# Patient Record
Sex: FEMALE | Race: WHITE | Employment: FULL TIME | URBAN - METROPOLITAN AREA
[De-identification: names, ages, dates, MRNs, and addresses within clinical notes are randomized per-mention and may not be internally consistent; named-entity substitution may affect disease eponyms.]

---

## 2019-11-13 ENCOUNTER — HOSPITAL ENCOUNTER (EMERGENCY)
Age: 39
Discharge: HOME | End: 2019-11-13
Payer: COMMERCIAL

## 2019-11-13 ENCOUNTER — APPOINTMENT (OUTPATIENT)
Dept: GENERAL RADIOLOGY | Age: 39
End: 2019-11-13
Payer: COMMERCIAL

## 2019-11-13 VITALS
TEMPERATURE: 98.1 F | BODY MASS INDEX: 39.27 KG/M2 | RESPIRATION RATE: 18 BRPM | OXYGEN SATURATION: 100 % | HEART RATE: 95 BPM | SYSTOLIC BLOOD PRESSURE: 133 MMHG | DIASTOLIC BLOOD PRESSURE: 67 MMHG | WEIGHT: 230 LBS | HEIGHT: 64 IN

## 2019-11-13 DIAGNOSIS — S90.851A FOREIGN BODY IN RIGHT FOOT, INITIAL ENCOUNTER: Primary | ICD-10-CM

## 2019-11-13 DIAGNOSIS — Z23 NEED FOR TDAP VACCINATION: ICD-10-CM

## 2019-11-13 PROCEDURE — 90715 TDAP VACCINE 7 YRS/> IM: CPT | Performed by: NURSE PRACTITIONER

## 2019-11-13 PROCEDURE — 6360000002 HC RX W HCPCS: Performed by: NURSE PRACTITIONER

## 2019-11-13 PROCEDURE — 90471 IMMUNIZATION ADMIN: CPT | Performed by: NURSE PRACTITIONER

## 2019-11-13 PROCEDURE — 73630 X-RAY EXAM OF FOOT: CPT

## 2019-11-13 PROCEDURE — 2500000003 HC RX 250 WO HCPCS: Performed by: NURSE PRACTITIONER

## 2019-11-13 PROCEDURE — 99283 EMERGENCY DEPT VISIT LOW MDM: CPT

## 2019-11-13 PROCEDURE — 10120 INC&RMVL FB SUBQ TISS SMPL: CPT

## 2019-11-13 RX ORDER — LIDOCAINE HYDROCHLORIDE 10 MG/ML
5 INJECTION, SOLUTION EPIDURAL; INFILTRATION; INTRACAUDAL; PERINEURAL ONCE
Status: COMPLETED | OUTPATIENT
Start: 2019-11-13 | End: 2019-11-13

## 2019-11-13 RX ORDER — ESTRADIOL AND NORETHINDRONE ACETATE .5; .1 MG/1; MG/1
TABLET ORAL DAILY
COMMUNITY

## 2019-11-13 RX ORDER — DOXYCYCLINE HYCLATE 50 MG/1
324 CAPSULE, GELATIN COATED ORAL
COMMUNITY

## 2019-11-13 RX ORDER — CEPHALEXIN 500 MG/1
1000 CAPSULE ORAL 2 TIMES DAILY
Qty: 40 CAPSULE | Refills: 0 | Status: SHIPPED | OUTPATIENT
Start: 2019-11-13

## 2019-11-13 RX ORDER — OMEPRAZOLE 40 MG/1
40 CAPSULE, DELAYED RELEASE ORAL DAILY
COMMUNITY

## 2019-11-13 RX ORDER — ERGOCALCIFEROL (VITAMIN D2) 1250 MCG
50000 CAPSULE ORAL WEEKLY
COMMUNITY

## 2019-11-13 RX ADMIN — LIDOCAINE HYDROCHLORIDE 5 ML: 10 INJECTION, SOLUTION EPIDURAL; INFILTRATION; INTRACAUDAL; PERINEURAL at 12:27

## 2019-11-13 RX ADMIN — TETANUS TOXOID, REDUCED DIPHTHERIA TOXOID AND ACELLULAR PERTUSSIS VACCINE, ADSORBED 0.5 ML: 5; 2.5; 8; 8; 2.5 SUSPENSION INTRAMUSCULAR at 12:24

## 2019-11-13 SDOH — HEALTH STABILITY: MENTAL HEALTH: HOW OFTEN DO YOU HAVE A DRINK CONTAINING ALCOHOL?: NEVER

## 2019-11-13 ASSESSMENT — PAIN SCALES - GENERAL: PAINLEVEL_OUTOF10: 7

## 2019-11-13 ASSESSMENT — PAIN DESCRIPTION - ONSET: ONSET: SUDDEN

## 2019-11-13 ASSESSMENT — PAIN DESCRIPTION - ORIENTATION: ORIENTATION: RIGHT

## 2019-11-13 ASSESSMENT — PAIN DESCRIPTION - FREQUENCY: FREQUENCY: CONTINUOUS

## 2019-11-13 ASSESSMENT — PAIN DESCRIPTION - LOCATION: LOCATION: FOOT

## 2019-11-13 ASSESSMENT — ENCOUNTER SYMPTOMS
ABDOMINAL PAIN: 0
SHORTNESS OF BREATH: 0
COUGH: 0
BACK PAIN: 0

## 2019-11-13 ASSESSMENT — PAIN DESCRIPTION - DESCRIPTORS: DESCRIPTORS: THROBBING

## 2019-11-13 ASSESSMENT — PAIN DESCRIPTION - PAIN TYPE: TYPE: ACUTE PAIN

## 2023-03-01 LAB
ALANINE AMINOTRANSFERASE (SGPT) (U/L) IN SER/PLAS: 24 U/L (ref 7–45)
ALBUMIN (G/DL) IN SER/PLAS: 4.4 G/DL (ref 3.4–5)
ALKALINE PHOSPHATASE (U/L) IN SER/PLAS: 91 U/L (ref 33–110)
ANION GAP IN SER/PLAS: 11 MMOL/L (ref 10–20)
ASPARTATE AMINOTRANSFERASE (SGOT) (U/L) IN SER/PLAS: 20 U/L (ref 9–39)
BILIRUBIN TOTAL (MG/DL) IN SER/PLAS: 0.4 MG/DL (ref 0–1.2)
CALCIUM (MG/DL) IN SER/PLAS: 9.4 MG/DL (ref 8.6–10.3)
CARBON DIOXIDE, TOTAL (MMOL/L) IN SER/PLAS: 25 MMOL/L (ref 21–32)
CHLORIDE (MMOL/L) IN SER/PLAS: 106 MMOL/L (ref 98–107)
CREATININE (MG/DL) IN SER/PLAS: 0.98 MG/DL (ref 0.5–1.05)
ERYTHROCYTE DISTRIBUTION WIDTH (RATIO) BY AUTOMATED COUNT: 14.2 % (ref 11.5–14.5)
ERYTHROCYTE MEAN CORPUSCULAR HEMOGLOBIN CONCENTRATION (G/DL) BY AUTOMATED: 32.3 G/DL (ref 32–36)
ERYTHROCYTE MEAN CORPUSCULAR VOLUME (FL) BY AUTOMATED COUNT: 92 FL (ref 80–100)
ERYTHROCYTES (10*6/UL) IN BLOOD BY AUTOMATED COUNT: 4.68 X10E12/L (ref 4–5.2)
GFR FEMALE: 74 ML/MIN/1.73M2
GLUCOSE (MG/DL) IN SER/PLAS: 122 MG/DL (ref 74–99)
HEMATOCRIT (%) IN BLOOD BY AUTOMATED COUNT: 43 % (ref 36–46)
HEMOGLOBIN (G/DL) IN BLOOD: 13.9 G/DL (ref 12–16)
LEUKOCYTES (10*3/UL) IN BLOOD BY AUTOMATED COUNT: 7.8 X10E9/L (ref 4.4–11.3)
PLATELETS (10*3/UL) IN BLOOD AUTOMATED COUNT: 313 X10E9/L (ref 150–450)
POTASSIUM (MMOL/L) IN SER/PLAS: 4 MMOL/L (ref 3.5–5.3)
PROTEIN TOTAL: 7.6 G/DL (ref 6.4–8.2)
SODIUM (MMOL/L) IN SER/PLAS: 138 MMOL/L (ref 136–145)
UREA NITROGEN (MG/DL) IN SER/PLAS: 17 MG/DL (ref 6–23)

## 2023-03-06 PROBLEM — J40 SINOBRONCHITIS: Status: ACTIVE | Noted: 2023-03-06

## 2023-03-06 PROBLEM — J30.9 ALLERGIC RHINITIS: Status: ACTIVE | Noted: 2023-03-06

## 2023-03-06 PROBLEM — J18.9 PNEUMONIA INVOLVING RIGHT LUNG: Status: ACTIVE | Noted: 2023-03-06

## 2023-03-06 PROBLEM — N90.89 LABIAL ADHESIONS: Status: ACTIVE | Noted: 2023-03-06

## 2023-03-06 PROBLEM — K57.32 DIVERTICULITIS, COLON: Status: ACTIVE | Noted: 2023-03-06

## 2023-03-06 PROBLEM — G51.8 FACIAL NEURITIS: Status: ACTIVE | Noted: 2023-03-06

## 2023-03-06 PROBLEM — E66.01 MORBID OBESITY WITH BODY MASS INDEX (BMI) OF 40.0 OR HIGHER (MULTI): Status: ACTIVE | Noted: 2023-03-06

## 2023-03-06 PROBLEM — D50.9 IRON DEFICIENCY ANEMIA: Status: ACTIVE | Noted: 2023-03-06

## 2023-03-06 PROBLEM — E89.41 SURGICAL MENOPAUSE, SYMPTOMATIC: Status: ACTIVE | Noted: 2023-03-06

## 2023-03-06 PROBLEM — C21.0 ANAL SQUAMOUS CELL CARCINOMA (MULTI): Status: ACTIVE | Noted: 2023-03-06

## 2023-03-06 PROBLEM — E55.9 VITAMIN D DEFICIENCY: Status: ACTIVE | Noted: 2023-03-06

## 2023-03-06 PROBLEM — F32.9 MDD (MAJOR DEPRESSIVE DISORDER): Status: ACTIVE | Noted: 2023-03-06

## 2023-03-06 PROBLEM — G43.119 MIGRAINE WITH AURA, INTRACTABLE: Status: ACTIVE | Noted: 2023-03-06

## 2023-03-06 PROBLEM — L65.9 LOSS OF HAIR: Status: ACTIVE | Noted: 2023-03-06

## 2023-03-06 PROBLEM — M79.10 MYALGIA: Status: ACTIVE | Noted: 2023-03-06

## 2023-03-06 PROBLEM — R10.2 VAGINAL PAIN: Status: ACTIVE | Noted: 2023-03-06

## 2023-03-06 PROBLEM — J34.89 SINUS PAIN: Status: ACTIVE | Noted: 2023-03-06

## 2023-03-06 PROBLEM — G25.81 RESTLESS LEGS SYNDROME: Status: ACTIVE | Noted: 2023-03-06

## 2023-03-06 PROBLEM — R50.9 FEVER: Status: ACTIVE | Noted: 2023-03-06

## 2023-03-06 PROBLEM — R23.2 VASOMOTOR FLUSHING: Status: ACTIVE | Noted: 2023-03-06

## 2023-03-06 PROBLEM — N76.0 RADIATION VAGINITIS: Status: ACTIVE | Noted: 2023-03-06

## 2023-03-06 PROBLEM — J32.9 SINOBRONCHITIS: Status: ACTIVE | Noted: 2023-03-06

## 2023-03-06 PROBLEM — G43.909 MIGRAINE SYNDROME: Status: ACTIVE | Noted: 2023-03-06

## 2023-03-06 PROBLEM — R05.9 COUGH: Status: ACTIVE | Noted: 2023-03-06

## 2023-03-06 PROBLEM — N89.5 VAGINAL ADHESIONS: Status: ACTIVE | Noted: 2023-03-06

## 2023-03-06 PROBLEM — L60.0 INGROWN TOENAIL OF BOTH FEET: Status: ACTIVE | Noted: 2023-03-06

## 2023-03-06 PROBLEM — M25.532 LEFT WRIST PAIN: Status: ACTIVE | Noted: 2023-03-06

## 2023-03-06 PROBLEM — R19.7 ACUTE DIARRHEA: Status: ACTIVE | Noted: 2023-03-06

## 2023-03-06 PROBLEM — L65.8 FEMALE PATTERN HAIR LOSS: Status: ACTIVE | Noted: 2023-03-06

## 2023-03-06 PROBLEM — K21.9 CHRONIC GERD: Status: ACTIVE | Noted: 2023-03-06

## 2023-03-06 RX ORDER — RIZATRIPTAN BENZOATE 10 MG/1
TABLET, ORALLY DISINTEGRATING ORAL
COMMUNITY

## 2023-03-06 RX ORDER — ESTRADIOL 0.05 MG/D
PATCH TRANSDERMAL
COMMUNITY
Start: 2022-07-28

## 2023-03-06 RX ORDER — PROGESTERONE 100 MG/1
1 CAPSULE ORAL DAILY
COMMUNITY
Start: 2022-06-29

## 2023-03-06 RX ORDER — LORATADINE 10 MG/1
1 TABLET ORAL NIGHTLY
COMMUNITY
Start: 2019-09-18 | End: 2023-03-07 | Stop reason: SDUPTHER

## 2023-03-06 RX ORDER — ERGOCALCIFEROL 1.25 MG/1
1 CAPSULE ORAL
COMMUNITY
Start: 2020-05-13 | End: 2023-04-10 | Stop reason: SDUPTHER

## 2023-03-06 RX ORDER — ALBUTEROL SULFATE 90 UG/1
2 AEROSOL, METERED RESPIRATORY (INHALATION) EVERY 4 HOURS PRN
COMMUNITY

## 2023-03-06 RX ORDER — TOPIRAMATE 50 MG/1
1 TABLET, FILM COATED ORAL 2 TIMES DAILY
COMMUNITY
End: 2023-03-07 | Stop reason: SDUPTHER

## 2023-03-06 RX ORDER — CHONDROIT/COLLAGEN/HYALURON/AA 80-400-40
1 CAPSULE ORAL DAILY
COMMUNITY
Start: 2022-06-29

## 2023-03-06 RX ORDER — ROPINIROLE 4 MG/1
1 TABLET, FILM COATED ORAL NIGHTLY
COMMUNITY
End: 2023-03-07 | Stop reason: SDUPTHER

## 2023-03-06 RX ORDER — ESTRADIOL 0.07 MG/D
PATCH TRANSDERMAL
COMMUNITY
Start: 2022-07-28

## 2023-03-06 RX ORDER — DULOXETIN HYDROCHLORIDE 60 MG/1
1 CAPSULE, DELAYED RELEASE ORAL 2 TIMES DAILY
COMMUNITY
End: 2023-03-07 | Stop reason: SDUPTHER

## 2023-03-06 RX ORDER — OMEPRAZOLE 40 MG/1
1 CAPSULE, DELAYED RELEASE ORAL DAILY
COMMUNITY
Start: 2018-08-01 | End: 2023-03-07 | Stop reason: SDUPTHER

## 2023-03-07 DIAGNOSIS — G25.81 RESTLESS LEGS SYNDROME: ICD-10-CM

## 2023-03-07 DIAGNOSIS — G43.909 MIGRAINE SYNDROME: ICD-10-CM

## 2023-03-07 DIAGNOSIS — J30.89 SEASONAL ALLERGIC RHINITIS DUE TO OTHER ALLERGIC TRIGGER: ICD-10-CM

## 2023-03-07 DIAGNOSIS — K21.9 CHRONIC GERD: ICD-10-CM

## 2023-03-07 DIAGNOSIS — F33.9 EPISODE OF RECURRENT MAJOR DEPRESSIVE DISORDER, UNSPECIFIED DEPRESSION EPISODE SEVERITY (CMS-HCC): ICD-10-CM

## 2023-03-07 RX ORDER — ROPINIROLE 4 MG/1
4 TABLET, FILM COATED ORAL NIGHTLY
Qty: 30 TABLET | Refills: 0 | Status: SHIPPED | OUTPATIENT
Start: 2023-03-07 | End: 2023-04-10 | Stop reason: SDUPTHER

## 2023-03-07 RX ORDER — LORATADINE 10 MG/1
10 TABLET ORAL NIGHTLY
Qty: 30 TABLET | Refills: 0 | Status: SHIPPED | OUTPATIENT
Start: 2023-03-07 | End: 2023-04-10 | Stop reason: SDUPTHER

## 2023-03-07 RX ORDER — OMEPRAZOLE 40 MG/1
40 CAPSULE, DELAYED RELEASE ORAL DAILY
Qty: 30 CAPSULE | Refills: 0 | Status: SHIPPED | OUTPATIENT
Start: 2023-03-07 | End: 2023-04-10 | Stop reason: SDUPTHER

## 2023-03-07 RX ORDER — TOPIRAMATE 50 MG/1
50 TABLET, FILM COATED ORAL 2 TIMES DAILY
Qty: 60 TABLET | Refills: 0 | Status: SHIPPED | OUTPATIENT
Start: 2023-03-07 | End: 2023-04-10 | Stop reason: SDUPTHER

## 2023-03-07 RX ORDER — DULOXETIN HYDROCHLORIDE 60 MG/1
60 CAPSULE, DELAYED RELEASE ORAL 2 TIMES DAILY
Qty: 60 CAPSULE | Refills: 0 | Status: SHIPPED | OUTPATIENT
Start: 2023-03-07 | End: 2023-04-10 | Stop reason: SDUPTHER

## 2023-04-06 ENCOUNTER — APPOINTMENT (OUTPATIENT)
Dept: PRIMARY CARE | Facility: CLINIC | Age: 43
End: 2023-04-06
Payer: COMMERCIAL

## 2023-04-10 ENCOUNTER — OFFICE VISIT (OUTPATIENT)
Dept: PRIMARY CARE | Facility: CLINIC | Age: 43
End: 2023-04-10
Payer: COMMERCIAL

## 2023-04-10 VITALS
WEIGHT: 274 LBS | TEMPERATURE: 98 F | DIASTOLIC BLOOD PRESSURE: 80 MMHG | BODY MASS INDEX: 45.65 KG/M2 | SYSTOLIC BLOOD PRESSURE: 124 MMHG | RESPIRATION RATE: 18 BRPM | HEART RATE: 80 BPM | HEIGHT: 65 IN

## 2023-04-10 DIAGNOSIS — F33.9 EPISODE OF RECURRENT MAJOR DEPRESSIVE DISORDER, UNSPECIFIED DEPRESSION EPISODE SEVERITY (CMS-HCC): ICD-10-CM

## 2023-04-10 DIAGNOSIS — G25.81 RESTLESS LEGS SYNDROME: ICD-10-CM

## 2023-04-10 DIAGNOSIS — G43.909 MIGRAINE SYNDROME: ICD-10-CM

## 2023-04-10 DIAGNOSIS — K21.9 CHRONIC GERD: ICD-10-CM

## 2023-04-10 DIAGNOSIS — J30.89 SEASONAL ALLERGIC RHINITIS DUE TO OTHER ALLERGIC TRIGGER: ICD-10-CM

## 2023-04-10 DIAGNOSIS — E55.9 VITAMIN D DEFICIENCY: ICD-10-CM

## 2023-04-10 PROBLEM — J32.9 SINOBRONCHITIS: Status: RESOLVED | Noted: 2023-03-06 | Resolved: 2023-04-10

## 2023-04-10 PROBLEM — M25.532 LEFT WRIST PAIN: Status: RESOLVED | Noted: 2023-03-06 | Resolved: 2023-04-10

## 2023-04-10 PROBLEM — R05.9 COUGH: Status: RESOLVED | Noted: 2023-03-06 | Resolved: 2023-04-10

## 2023-04-10 PROBLEM — N89.5 VAGINAL ADHESIONS: Status: RESOLVED | Noted: 2023-03-06 | Resolved: 2023-04-10

## 2023-04-10 PROBLEM — J34.89 SINUS PAIN: Status: RESOLVED | Noted: 2023-03-06 | Resolved: 2023-04-10

## 2023-04-10 PROBLEM — J40 SINOBRONCHITIS: Status: RESOLVED | Noted: 2023-03-06 | Resolved: 2023-04-10

## 2023-04-10 PROBLEM — R50.9 FEVER: Status: RESOLVED | Noted: 2023-03-06 | Resolved: 2023-04-10

## 2023-04-10 PROCEDURE — 99214 OFFICE O/P EST MOD 30 MIN: CPT | Performed by: FAMILY MEDICINE

## 2023-04-10 PROCEDURE — 1036F TOBACCO NON-USER: CPT | Performed by: FAMILY MEDICINE

## 2023-04-10 RX ORDER — PROGESTERONE 100 MG/1
100 CAPSULE ORAL DAILY
Qty: 90 CAPSULE | Refills: 1 | Status: CANCELLED | OUTPATIENT
Start: 2023-04-10

## 2023-04-10 RX ORDER — ERGOCALCIFEROL 1.25 MG/1
50000 CAPSULE ORAL
Qty: 15 CAPSULE | Refills: 1 | Status: SHIPPED | OUTPATIENT
Start: 2023-04-10

## 2023-04-10 RX ORDER — DULOXETIN HYDROCHLORIDE 60 MG/1
60 CAPSULE, DELAYED RELEASE ORAL 2 TIMES DAILY
Qty: 180 CAPSULE | Refills: 1 | Status: SHIPPED | OUTPATIENT
Start: 2023-04-10

## 2023-04-10 RX ORDER — OMEPRAZOLE 40 MG/1
40 CAPSULE, DELAYED RELEASE ORAL DAILY
Qty: 90 CAPSULE | Refills: 1 | Status: SHIPPED | OUTPATIENT
Start: 2023-04-10

## 2023-04-10 RX ORDER — LORATADINE 10 MG/1
10 TABLET ORAL NIGHTLY
Qty: 90 TABLET | Refills: 1 | Status: SHIPPED | OUTPATIENT
Start: 2023-04-10

## 2023-04-10 RX ORDER — ROPINIROLE 4 MG/1
4 TABLET, FILM COATED ORAL NIGHTLY
Qty: 90 TABLET | Refills: 1 | Status: SHIPPED | OUTPATIENT
Start: 2023-04-10

## 2023-04-10 RX ORDER — TOPIRAMATE 50 MG/1
50 TABLET, FILM COATED ORAL 2 TIMES DAILY
Qty: 90 TABLET | Refills: 1 | Status: SHIPPED | OUTPATIENT
Start: 2023-04-10

## 2023-04-10 NOTE — PROGRESS NOTES
Елена Vaughn is a 42 y.o. female here today for 6 month follow up depression, vitamin d deficiency, Gerd, RLS, and migraines.      GERD recheck -- Patient reports GI symptoms are well controlled with current treatment.  No heartburn, chest pain, vomiting, diarrhea.  No SE's from current treatment.  Patient wishes to continue the same treatment.  Has to take meds daily.      Mood disorder recheck -- Patient feels like condition is well controlled.  Has good control of mood and emotional reactions.  No SE's or problems with medications.  No homicidal or suicidal ideation.  Patient wishes to continue same medications.      RLS - taking medication nightly.  Seems to work well with no SE's.      Vitamin D deficiency -- Patient reports no muscle weakness or pain.  Taking Vitamin D as instructed. Takes 50,000 U weekly.      Migraine HA's - stable with rare HA's.  No problems with medications.  No new headaches and no focal neurological deficits.  Very rare HA's now.  Only one every 2-3 months.      Anal cancer - seeing oncologist and other specialists for fu after treatment.     Objective    Visit Vitals  /80   Pulse 80   Temp 36.7 °C (98 °F)   Resp 18     Body mass index is 45.6 kg/m².     Physical Exam   General - Not in acute distress and cooperative.  Build & Nutrition - Well developed  Posture - Normal  Gait - Normal  Mental Status - alert and oriented x 3    Head - Normocephalic    Neck - Thyroid normal size    Eyes - Bilateral - Sclera clear and lids pink without edema or mass.      Skin - Warm and dry with no rashes on visible skin    Lungs - Clear to auscultation and normal breathing effort    Cardiovascular - RRR and no murmurs, rubs or thrill.    Peripheral Vascular - Bilateral - no edema present    Neuropsychiatric - normal mood and affect        Assessment    1. Episode of recurrent major depressive disorder, unspecified depression episode severity (CMS/HCC)     Condition well controlled.  No change in  current treatment regimen.  Refill given of current medication.  Appropriate labs ordered or reviewed.  Make a follow up appointment with me for recheck in 6 months.   2. Vitamin D deficiency     Condition well controlled.  No change in current treatment regimen.  Refill given of current medication.  Appropriate labs ordered or reviewed.  Make a follow up appointment with me for recheck in 6 months.   3. Chronic GERD     Condition well controlled.  No change in current treatment regimen.  Refill given of current medication.  Make a follow up appointment with me for recheck in 6 months.   4. Restless legs syndrome     Condition well controlled.  No change in current treatment regimen.  Refill given of current medication.  Make a follow up appointment with me for recheck in 6 months.   5. Migraine syndrome     Condition well controlled.  No change in current treatment regimen.  Refill given of current medication.  Make a follow up appointment with me for recheck in 6 months.

## 2023-10-10 ENCOUNTER — APPOINTMENT (OUTPATIENT)
Dept: PRIMARY CARE | Facility: CLINIC | Age: 43
End: 2023-10-10
Payer: COMMERCIAL

## 2024-02-05 ENCOUNTER — HOSPITAL ENCOUNTER (EMERGENCY)
Facility: HOSPITAL | Age: 44
Discharge: HOME | End: 2024-02-05
Attending: EMERGENCY MEDICINE

## 2024-02-05 VITALS
RESPIRATION RATE: 16 BRPM | TEMPERATURE: 96.8 F | HEART RATE: 68 BPM | SYSTOLIC BLOOD PRESSURE: 132 MMHG | BODY MASS INDEX: 34.99 KG/M2 | DIASTOLIC BLOOD PRESSURE: 89 MMHG | OXYGEN SATURATION: 97 % | HEIGHT: 65 IN | WEIGHT: 210 LBS

## 2024-02-05 DIAGNOSIS — H67.1 OTITIS MEDIA OF RIGHT EAR IN DISEASE CLASSIFIED ELSEWHERE: Primary | ICD-10-CM

## 2024-02-05 LAB
FLUAV RNA RESP QL NAA+PROBE: NOT DETECTED
FLUBV RNA RESP QL NAA+PROBE: NOT DETECTED
RSV RNA RESP QL NAA+PROBE: NOT DETECTED
SARS-COV-2 RNA RESP QL NAA+PROBE: NOT DETECTED

## 2024-02-05 PROCEDURE — 2500000001 HC RX 250 WO HCPCS SELF ADMINISTERED DRUGS (ALT 637 FOR MEDICARE OP): Performed by: EMERGENCY MEDICINE

## 2024-02-05 PROCEDURE — 99284 EMERGENCY DEPT VISIT MOD MDM: CPT | Performed by: EMERGENCY MEDICINE

## 2024-02-05 PROCEDURE — 87637 SARSCOV2&INF A&B&RSV AMP PRB: CPT | Performed by: EMERGENCY MEDICINE

## 2024-02-05 PROCEDURE — 99283 EMERGENCY DEPT VISIT LOW MDM: CPT | Performed by: EMERGENCY MEDICINE

## 2024-02-05 RX ORDER — AMOXICILLIN AND CLAVULANATE POTASSIUM 875; 125 MG/1; MG/1
1 TABLET, FILM COATED ORAL ONCE
Status: COMPLETED | OUTPATIENT
Start: 2024-02-05 | End: 2024-02-05

## 2024-02-05 RX ORDER — AMOXICILLIN AND CLAVULANATE POTASSIUM 875; 125 MG/1; MG/1
1 TABLET, FILM COATED ORAL EVERY 12 HOURS
Qty: 14 TABLET | Refills: 0 | Status: SHIPPED | OUTPATIENT
Start: 2024-02-05

## 2024-02-05 RX ADMIN — AMOXICILLIN AND CLAVULANATE POTASSIUM 875 MG: 875; 125 TABLET, FILM COATED ORAL at 02:20

## 2024-02-05 ASSESSMENT — PAIN DESCRIPTION - ORIENTATION: ORIENTATION: RIGHT

## 2024-02-05 ASSESSMENT — COLUMBIA-SUICIDE SEVERITY RATING SCALE - C-SSRS
6. HAVE YOU EVER DONE ANYTHING, STARTED TO DO ANYTHING, OR PREPARED TO DO ANYTHING TO END YOUR LIFE?: NO
2. HAVE YOU ACTUALLY HAD ANY THOUGHTS OF KILLING YOURSELF?: NO
1. IN THE PAST MONTH, HAVE YOU WISHED YOU WERE DEAD OR WISHED YOU COULD GO TO SLEEP AND NOT WAKE UP?: NO

## 2024-02-05 ASSESSMENT — PAIN SCALES - GENERAL: PAINLEVEL_OUTOF10: 4

## 2024-02-05 ASSESSMENT — PAIN DESCRIPTION - LOCATION: LOCATION: THROAT

## 2024-02-05 ASSESSMENT — PAIN - FUNCTIONAL ASSESSMENT: PAIN_FUNCTIONAL_ASSESSMENT: 0-10

## 2024-02-05 NOTE — ED PROVIDER NOTES
HPI   Chief Complaint   Patient presents with    Flu Symptoms     Sore throat and ear pain x4 days.        43 female to emerged part with chief complaint of sore throat, congestion, right ear pain.  The symptoms started Wednesday night.  She had a low-grade temp at 99 5.  She had some chills on Wednesday.  No significant rhinorrhea.  Is more congestion.  She has had a mild cough.  No abdominal pain.  No chest pain.  No shortness of breath.  No nausea or vomiting.  No diarrhea.  No urinary symptoms.  No headache or dizziness.    Past Medical History: Denies    Surgical History: None recently    Medications: See Nursing Notes    Allergies: See nursing notes    Social History: She does smoke.  She is advised to quit.  Denies drugs or alcohol.    Primary Care Physician: Grecia    Unless otherwise stated in this report the patient's positive and negative responses for review of systems for constitutional, eyes, ENT, cardiovascular, respiratory, gastrointestinal, neurological, genitourinary, musculoskeletal, and integument systems and related systems to the presenting problem are either as stated in the HPI or were not pertinent or were negative for the symptoms and/or complaints related to the presenting medical problem.      EXAM:  Vital signs reviewed  General:  Appears well  Alert  HEENT:  Head atraumatic  Eyes normal inspection  No significant exudate in the tonsils.  There is some very mild pharyngeal erythema on the right.  No drooling or trismus or stridor.  Uvula midline.  Right tympanic membrane does have some fluid behind it.  It is mildly erythematous.  NECK:  Normal inspection, no meningismus  RESPIRATORY:  Normal breath sounds  No chest wall tenderness  No respiratory distress  CVS:  Heart rate and rhythm regular  No Murmurs  ABDOMEN/GI:  Soft  Non-tender  Bowel sounds normal  FEMALE GENITAL:  []  MALE GENITAL:  []  RECTAL:  []  BACK:  Normal inspection  EXTREMITIES:  Non-Tender  Full ROM  Normal  appearance  NEURO:  Alert and oriented X 3  CN's normal as tested  Sensation normal  Motor normal  PSYCH:  Mood normal  Affect normal  SKIN:  Color normal  No rash  Warm  Dry                              Kathy Coma Scale Score: 15                  Patient History   Past Medical History:   Diagnosis Date    Abnormal weight gain     Weight gain    Ataxic gait     Ataxic gait    Cough 03/06/2023    Fever 03/06/2023    Left wrist pain 03/06/2023    Malignant (primary) neoplasm, unspecified (CMS/HCC)     Cancer    Other intervertebral disc degeneration, lumbar region     Disc degeneration, lumbar    Other specific joint derangements of unspecified joint, not elsewhere classified     Joint crepitus    Paresthesia of skin     Tingling    Personal history of other diseases of the female genital tract     History of polycystic ovarian syndrome    Personal history of other diseases of the musculoskeletal system and connective tissue     History of low back pain    Personal history of other diseases of the nervous system and sense organs     History of migraine headaches    Personal history of other endocrine, nutritional and metabolic disease 08/01/2019    History of iron deficiency    Postmenopausal atrophic vaginitis 08/01/2019    Atrophic vaginitis    Sinobronchitis 03/06/2023    Sinus pain 03/06/2023     Past Surgical History:   Procedure Laterality Date    HERNIA REPAIR  07/19/2013    Hernia Repair    OTHER SURGICAL HISTORY  08/01/2019    Surgery     Family History   Problem Relation Name Age of Onset    No Known Problems Mother      No Known Problems Father       Social History     Tobacco Use    Smoking status: Former     Types: Cigarettes    Smokeless tobacco: Never   Vaping Use    Vaping Use: Never used   Substance Use Topics    Alcohol use: Yes     Comment: rare    Drug use: Never       Physical Exam   ED Triage Vitals [02/05/24 0029]   Temperature Heart Rate Respirations BP   36 °C (96.8 °F) 79 20 145/77       Pulse Ox Temp Source Heart Rate Source Patient Position   95 % Temporal Monitor Sitting      BP Location FiO2 (%)     Right arm --       Physical Exam    ED Course & MDM   Diagnoses as of 02/05/24 0213   Otitis media of right ear in disease classified elsewhere       Medical Decision Making  Patient is given prescription for Augmentin to cover ear infection.  This also cover sinuses.  Her COVID flu and RSV are negative.    She is discharged home.  She is to follow-up with her doctor.  She is to return to the nearest ER for any new or worsening symptoms.  She is happy with this plan.    She is advised to use Afrin nasal spray.  She is advised not to use it for more than 3 days.        Procedure  Procedures     Juan Pitts, DO  02/05/24 0215

## 2024-02-05 NOTE — ED NOTES
Pt discharged in stable condition. Discharge instructions reviewed and understanding verbalized. All questions and concerns addressed.        Mona Jenkins RN  02/05/24 0222

## 2024-02-05 NOTE — DISCHARGE INSTRUCTIONS
Seek immediate medical attention if you develop: Worsening ear pain, discharge from your ear, worsening congestion, worsening sore throat, difficulty breathing,  difficulty swallowing, chest pain, shortness of breath or any new or worsening symptoms.    You can use Afrin nasal spray to help with your congestion and this may also help your ear pain.  Do not use this for more than 3 days.  Use as directed.

## 2024-06-18 ENCOUNTER — APPOINTMENT (OUTPATIENT)
Dept: RADIOLOGY | Facility: HOSPITAL | Age: 44
End: 2024-06-18

## 2024-06-18 ENCOUNTER — HOSPITAL ENCOUNTER (EMERGENCY)
Facility: HOSPITAL | Age: 44
Discharge: HOME | End: 2024-06-18
Attending: EMERGENCY MEDICINE

## 2024-06-18 VITALS
DIASTOLIC BLOOD PRESSURE: 74 MMHG | HEIGHT: 69 IN | OXYGEN SATURATION: 97 % | SYSTOLIC BLOOD PRESSURE: 141 MMHG | RESPIRATION RATE: 18 BRPM | TEMPERATURE: 97.3 F | HEART RATE: 75 BPM | WEIGHT: 230 LBS | BODY MASS INDEX: 34.07 KG/M2

## 2024-06-18 DIAGNOSIS — M25.531 RIGHT WRIST PAIN: Primary | ICD-10-CM

## 2024-06-18 PROCEDURE — 73110 X-RAY EXAM OF WRIST: CPT | Mod: LEFT SIDE | Performed by: RADIOLOGY

## 2024-06-18 PROCEDURE — 99283 EMERGENCY DEPT VISIT LOW MDM: CPT | Performed by: EMERGENCY MEDICINE

## 2024-06-18 PROCEDURE — 73110 X-RAY EXAM OF WRIST: CPT | Mod: LT

## 2024-06-18 RX ORDER — NAPROXEN 500 MG/1
500 TABLET ORAL EVERY 12 HOURS PRN
Qty: 20 TABLET | Refills: 0 | Status: SHIPPED | OUTPATIENT
Start: 2024-06-18

## 2024-06-18 RX ORDER — ACETAMINOPHEN 325 MG/1
650 TABLET ORAL ONCE
Status: COMPLETED | OUTPATIENT
Start: 2024-06-18 | End: 2024-06-18

## 2024-06-18 ASSESSMENT — LIFESTYLE VARIABLES
HAVE PEOPLE ANNOYED YOU BY CRITICIZING YOUR DRINKING: NO
EVER HAD A DRINK FIRST THING IN THE MORNING TO STEADY YOUR NERVES TO GET RID OF A HANGOVER: NO
TOTAL SCORE: 0
EVER FELT BAD OR GUILTY ABOUT YOUR DRINKING: NO
HAVE YOU EVER FELT YOU SHOULD CUT DOWN ON YOUR DRINKING: NO

## 2024-06-18 ASSESSMENT — PAIN - FUNCTIONAL ASSESSMENT: PAIN_FUNCTIONAL_ASSESSMENT: 0-10

## 2024-06-18 ASSESSMENT — PAIN SCALES - GENERAL
PAINLEVEL_OUTOF10: 8
PAINLEVEL_OUTOF10: 5 - MODERATE PAIN

## 2024-06-18 ASSESSMENT — PAIN DESCRIPTION - PAIN TYPE: TYPE: ACUTE PAIN

## 2024-06-18 NOTE — ED PROVIDER NOTES
HPI   Chief Complaint   Patient presents with    Wrist Pain       43-year-old female presenting for right wrist pain that was atraumatic in nature.  She states that she was lifting a approximately 1 to 2 pound box yesterday evening when she felt a pinching sensation in the ulnar aspect of her wrist and has been having discomfort in this area ever since.  She endorses occasional paresthesia into the right fourth and fifth digit with no motor weakness.  No redness or swelling.  She does report prior wrist fracture approximate 5 years ago managed through a different health system and she is not aware of any surgery or interventions other than casting that was required.  She denies any falls or head trauma.  She denies any fever, chills.  She did take Motrin around 5 AM this morning.  No longer follows with outpatient orthopedics.  She denies any other recent illness or concerns.  No chest pain or shortness of breath.                          Kathy Coma Scale Score: 15                     Patient History   Past Medical History:   Diagnosis Date    Abnormal weight gain     Weight gain    Ataxic gait     Ataxic gait    Cough 03/06/2023    Fever 03/06/2023    Left wrist pain 03/06/2023    Malignant (primary) neoplasm, unspecified (Multi)     Cancer    Other intervertebral disc degeneration, lumbar region     Disc degeneration, lumbar    Other specific joint derangements of unspecified joint, not elsewhere classified     Joint crepitus    Paresthesia of skin     Tingling    Personal history of other diseases of the female genital tract     History of polycystic ovarian syndrome    Personal history of other diseases of the musculoskeletal system and connective tissue     History of low back pain    Personal history of other diseases of the nervous system and sense organs     History of migraine headaches    Personal history of other endocrine, nutritional and metabolic disease 08/01/2019    History of iron deficiency     Postmenopausal atrophic vaginitis 08/01/2019    Atrophic vaginitis    Sinobronchitis 03/06/2023    Sinus pain 03/06/2023     Past Surgical History:   Procedure Laterality Date    HERNIA REPAIR  07/19/2013    Hernia Repair    OTHER SURGICAL HISTORY  08/01/2019    Surgery     Family History   Problem Relation Name Age of Onset    No Known Problems Mother      No Known Problems Father       Social History     Tobacco Use    Smoking status: Former     Types: Cigarettes    Smokeless tobacco: Never   Vaping Use    Vaping status: Never Used   Substance Use Topics    Alcohol use: Yes     Comment: rare    Drug use: Never       Physical Exam   ED Triage Vitals [06/18/24 1117]   Temperature Heart Rate Respirations BP   36.3 °C (97.3 °F) 82 16 163/88      Pulse Ox Temp Source Heart Rate Source Patient Position   98 % Tympanic Monitor Sitting      BP Location FiO2 (%)     Right arm --       Physical Exam  Vitals and nursing note reviewed.   Constitutional:       General: She is not in acute distress.     Appearance: She is well-developed. She is not ill-appearing.   HENT:      Head: Normocephalic and atraumatic.      Nose: No congestion or rhinorrhea.      Mouth/Throat:      Mouth: Mucous membranes are moist.      Pharynx: No oropharyngeal exudate or posterior oropharyngeal erythema.   Eyes:      Conjunctiva/sclera: Conjunctivae normal.   Cardiovascular:      Rate and Rhythm: Normal rate and regular rhythm.      Pulses: Normal pulses.      Heart sounds: No murmur heard.     No gallop.   Pulmonary:      Effort: Pulmonary effort is normal. No respiratory distress.      Breath sounds: Normal breath sounds. No stridor. No wheezing, rhonchi or rales.   Abdominal:      General: Bowel sounds are normal. There is no distension.      Palpations: Abdomen is soft.      Tenderness: There is no abdominal tenderness. There is no guarding or rebound.   Musculoskeletal:         General: Tenderness (Right ulnar styloid/dorsal carpal row.)  present. No swelling.      Cervical back: Neck supple.      Comments: Right hand Exam: Patient exhibits ability to flex and extend all digits. Including flexion at the proximal and distal interphalangeal joints against resistance.  Median nerve was tested with thumb abduction against resistance and opposition which were normal.  Sensory testing was performed of the median nerve using light touch on the radial and ulnar aspects of digits 1 through 3.  Radial nerve testing was performed with thumb extension against resistance which was normal.  Sensory testing of the radial nerve was normal via light touch to the central and radial aspect of the dorsum of the hand and dorsal radial aspect of the thumb.  Ulnar nerve was tested via abduction of fingers against resistance as well as light touch to the fourth and fifth digits.  Cap refill was less than 2 seconds in all 5 digits.  Alignment check was performed which revealed no malrotation.  No sign of acute compartment syndrome, flexor tenosynovitis, high-pressure injection injury.  No flexor tendon injury.    No pain to palpation over the bony aspect of the elbow or shoulder.  No pain over the scapula or clavicle.     Skin:     General: Skin is warm and dry.      Capillary Refill: Capillary refill takes less than 2 seconds.      Findings: No rash.   Neurological:      General: No focal deficit present.      Mental Status: She is alert and oriented to person, place, and time.      Cranial Nerves: No cranial nerve deficit.      Sensory: No sensory deficit.      Gait: Gait normal.   Psychiatric:         Mood and Affect: Mood normal.         Behavior: Behavior normal.         Thought Content: Thought content normal.         ED Course & [facility]   ED Course as of 06/18/24 1608   Tue Jun 18, 2024   0531 Review of prior wrist x-ray of this patient from December 2019 does reveal prior osseous body consistent with ulnar styloid process avulsion fracture noted.  Seems to be  consistent/present on today's x-ray as well. [TL]   1252 Patient placed in wrist splint and follow-up with orthopedics recommended.  Return precautions explained.  She was comfortable this plan all questions were answered.  Discharged stable condition.  No sign of acute fracture or dislocation.  No scapholunate dissociation. [TL]      ED Course User Index  [TL] David Amador DO         Diagnoses as of 06/18/24 1608   Right wrist pain       Medical Decision Making  Atraumatic left wrist pain identified.  Does not appear to be consistent with acute gout, pseudogout or septic arthritis of this was considered.  No surrounding redness or inflammation.  No complaints of fever or chills.  X-ray to be obtained and Tylenol to be given for pain control.  Patient already with ice pack at the bedside.  No obvious deformity my examination.  Low concern for occult scaphoid fracture given no pain with axial loading of the thumb or over the anatomic snuffbox.        Procedure  Procedures     David Amador DO  06/18/24 1600

## 2024-06-18 NOTE — DISCHARGE INSTRUCTIONS
Seek immediate medical attention if you develop: increasing pain, numbness, tingling, weakness, loss of motion in your wrist or hand, your hand or fingers become pale or cold, or you develop any new or worsening symptoms.

## 2024-06-18 NOTE — ED TRIAGE NOTES
Pt arrives to ED with c/o L wrist pain. Past fx 5yrs ago. No recent injury. Pt endorses n/t to 4th & 5th fingers.

## 2024-06-19 ENCOUNTER — OFFICE VISIT (OUTPATIENT)
Dept: ORTHOPEDIC SURGERY | Facility: CLINIC | Age: 44
End: 2024-06-19

## 2024-06-19 DIAGNOSIS — S69.82XA TFCC (TRIANGULAR FIBROCARTILAGE COMPLEX) INJURY, LEFT, INITIAL ENCOUNTER: ICD-10-CM

## 2024-06-19 DIAGNOSIS — S63.502A LEFT WRIST SPRAIN, INITIAL ENCOUNTER: Primary | ICD-10-CM

## 2024-06-19 PROCEDURE — 1036F TOBACCO NON-USER: CPT | Performed by: EMERGENCY MEDICINE

## 2024-06-19 PROCEDURE — 99204 OFFICE O/P NEW MOD 45 MIN: CPT | Performed by: EMERGENCY MEDICINE

## 2024-06-19 ASSESSMENT — PAIN - FUNCTIONAL ASSESSMENT: PAIN_FUNCTIONAL_ASSESSMENT: 0-10

## 2024-06-19 ASSESSMENT — PAIN DESCRIPTION - DESCRIPTORS: DESCRIPTORS: TINGLING;ACHING

## 2024-06-19 ASSESSMENT — PAIN SCALES - GENERAL: PAINLEVEL_OUTOF10: 7

## 2024-06-19 NOTE — LETTER
June 19, 2024     Patient: Елена Vaughn   YOB: 1980   Date of Visit: 6/19/2024       To Whom It May Concern:    It is my medical opinion that Елена Vaughn  may return to work without any restriction .    If you have any questions or concerns, please don't hesitate to call.         Sincerely,        Ryan Martinez MD    CC: No Recipients

## 2024-06-19 NOTE — PROGRESS NOTES
Subjective    Patient ID: Елена Vaughn is a 43 y.o. female.    Chief Complaint: Pain of the Left Wrist (NPV - 6/17/24 moving boxes must have done something to wrist.  )     Last Surgery: No surgery found  Last Surgery Date: No surgery found    Елена is a very pleasant 43-year-old female coming in after she injured her left wrist about 2 days prior to arrival, on 6/17/2024.  She says that she fell about 5 years ago while at work and thinks that she may have injured the wrist at that point but that overall she has been doing very well since then.  On Monday she was then cleaning and tried to quickly catch a heavy box with her left supinated and felt a pinching/sharp pain along the ulnar aspect of the left wrist near her distal ulna and TFCC areas.  She went to the emergency department the following day where x-rays were grossly unremarkable and she was placed in a Velcro wrist splint.  She was prescribed naproxen and was told to follow-up with orthopedics.        Objective   Right Hand Exam   Right hand exam is normal.      Left Hand Exam     Tenderness   Left hand tenderness location: Patient has tenderness to palpation mostly over the TFCC.  The anatomic snuffbox is nontender.     Range of Motion   Wrist   Extension:  abnormal   Flexion:  abnormal   Pronation:  abnormal   Supination:  abnormal     Muscle Strength   The patient has normal left wrist strength.    Other   Erythema: absent  Sensation: normal  Pulse: present    Comments:  Range of motion and strength testing are abnormal likely limited secondary to pain.  She has a positive TFCC grind test.  Some pain with PNO testing at the [facility].  She also has pain with wrist extension against resistance.   strength is slightly weaker but she has intact sensation  in the median ulnar and radial nerve distributions.            Image Results:  XR wrist left 3+ views  Narrative: STUDY:  Wrist Radiographs; 6/18/24 at 11:35 AM  INDICATION:  Left wrist pain, pain over  ulnar styloid.  History of fracture.  COMPARISON:  Left wrist x-ray 12/19/2019, 1/2/2020 and 6/13/2022.  ACCESSION NUMBER(S):  [id number]  ORDERING CLINICIAN:  MOHSEN GREENFIELD  TECHNIQUE:  Four view(s) of the left wrist.  FINDINGS:    There is no acute displaced fracture.  There is a tiny bony or  calcific density adjacent to the ulnar styloid process about 1 mm in  diameter also present previously.  This could be developmental or  related to old trauma.  A cystic lucency in the ulnar styloid is  increasing in size previously measuring 2 and 3 mm and now about 4 mm.   Small cystic changes in the triquetrum appear stable.  Small calcific  density adjacent to the anterior aspect of the carpus at the level of  the lunate about 1 mm in diameter, possible heterotopic calcification,  and also present on the recent priors.  There is soft tissue swelling  over the ulnar styloid.  Impression: 1.No acute bony abnormalities on x-ray examination of the left  wrist.  2.Small cystic lucency in the ulnar styloid which is slightly  increased in size from the prior. These could be related to  degenerative change or inflammatory change.  There is mild adjacent  soft tissue swelling.  Follow-up studies such as MRI could be  considered as clinically indicated.  Signed by Homa Duncan DO    X-rays of the left wrist were reviewed and interpreted by me on 6/19/2024 and did not reveal any obvious evidence of acute injuries or fractures.    Assessment/Plan   Encounter Diagnoses:  Left wrist sprain, initial encounter    TFCC (triangular fibrocartilage complex) injury, left, initial encounter    Orders Placed This Encounter    Referral to Occupational Therapy     No follow-ups on file.    We discussed her treatment options and agreed for her to continue the naproxen twice daily at prescription dosing for the next 3 to 4 weeks.  She will begin occupational therapy with an emphasis on developing and implementing a home exercise program.  She is  going to remain in the Velcro wrist splint otherwise and will follow-up with me in about 4 weeks to determine her response to this plan.  If she is still having significant pain and symptoms we will likely obtain an MRI of the left wrist or consider diagnostic/therapeutic injections under ultrasound guidance.    ** Please excuse any errors in grammar or translation related to this dictation. Voice recognition software was utilized to prepare this document. **       Ryan Martinez MD  [facility]

## 2024-07-05 ENCOUNTER — APPOINTMENT (OUTPATIENT)
Dept: GENERAL RADIOLOGY | Age: 44
End: 2024-07-05

## 2024-07-05 ENCOUNTER — HOSPITAL ENCOUNTER (EMERGENCY)
Age: 44
Discharge: HOME | End: 2024-07-05
Attending: STUDENT IN AN ORGANIZED HEALTH CARE EDUCATION/TRAINING PROGRAM

## 2024-07-05 VITALS
BODY MASS INDEX: 35.36 KG/M2 | RESPIRATION RATE: 20 BRPM | WEIGHT: 220 LBS | HEIGHT: 66 IN | SYSTOLIC BLOOD PRESSURE: 124 MMHG | TEMPERATURE: 98.3 F | DIASTOLIC BLOOD PRESSURE: 74 MMHG | OXYGEN SATURATION: 99 % | HEART RATE: 82 BPM

## 2024-07-05 DIAGNOSIS — L03.116 CELLULITIS OF LEFT LOWER EXTREMITY: Primary | ICD-10-CM

## 2024-07-05 PROCEDURE — 73560 X-RAY EXAM OF KNEE 1 OR 2: CPT

## 2024-07-05 PROCEDURE — 6360000002 HC RX W HCPCS: Performed by: STUDENT IN AN ORGANIZED HEALTH CARE EDUCATION/TRAINING PROGRAM

## 2024-07-05 PROCEDURE — 6370000000 HC RX 637 (ALT 250 FOR IP): Performed by: STUDENT IN AN ORGANIZED HEALTH CARE EDUCATION/TRAINING PROGRAM

## 2024-07-05 PROCEDURE — 96372 THER/PROPH/DIAG INJ SC/IM: CPT

## 2024-07-05 PROCEDURE — 99284 EMERGENCY DEPT VISIT MOD MDM: CPT

## 2024-07-05 RX ORDER — SULFAMETHOXAZOLE AND TRIMETHOPRIM 800; 160 MG/1; MG/1
1 TABLET ORAL 2 TIMES DAILY
Qty: 10 TABLET | Refills: 0 | Status: SHIPPED | OUTPATIENT
Start: 2024-07-05 | End: 2024-07-05

## 2024-07-05 RX ORDER — KETOROLAC TROMETHAMINE 30 MG/ML
30 INJECTION, SOLUTION INTRAMUSCULAR; INTRAVENOUS ONCE
Status: COMPLETED | OUTPATIENT
Start: 2024-07-05 | End: 2024-07-05

## 2024-07-05 RX ORDER — SULFAMETHOXAZOLE AND TRIMETHOPRIM 800; 160 MG/1; MG/1
1 TABLET ORAL ONCE
Status: COMPLETED | OUTPATIENT
Start: 2024-07-05 | End: 2024-07-05

## 2024-07-05 RX ORDER — SULFAMETHOXAZOLE AND TRIMETHOPRIM 800; 160 MG/1; MG/1
1 TABLET ORAL 2 TIMES DAILY
Qty: 10 TABLET | Refills: 0 | Status: SHIPPED | OUTPATIENT
Start: 2024-07-05 | End: 2024-07-10

## 2024-07-05 RX ADMIN — KETOROLAC TROMETHAMINE 30 MG: 30 INJECTION INTRAMUSCULAR; INTRAVENOUS at 19:39

## 2024-07-05 RX ADMIN — SULFAMETHOXAZOLE AND TRIMETHOPRIM 1 TABLET: 800; 160 TABLET ORAL at 19:39

## 2024-07-05 ASSESSMENT — PAIN - FUNCTIONAL ASSESSMENT: PAIN_FUNCTIONAL_ASSESSMENT: 0-10

## 2024-07-05 ASSESSMENT — PAIN DESCRIPTION - ORIENTATION
ORIENTATION: LEFT
ORIENTATION: LEFT

## 2024-07-05 ASSESSMENT — LIFESTYLE VARIABLES
HOW OFTEN DO YOU HAVE A DRINK CONTAINING ALCOHOL: NEVER
HOW MANY STANDARD DRINKS CONTAINING ALCOHOL DO YOU HAVE ON A TYPICAL DAY: PATIENT DOES NOT DRINK

## 2024-07-05 ASSESSMENT — PAIN DESCRIPTION - FREQUENCY: FREQUENCY: CONTINUOUS

## 2024-07-05 ASSESSMENT — PAIN DESCRIPTION - PAIN TYPE: TYPE: ACUTE PAIN

## 2024-07-05 ASSESSMENT — PAIN DESCRIPTION - DESCRIPTORS: DESCRIPTORS: THROBBING

## 2024-07-05 ASSESSMENT — PAIN SCALES - GENERAL
PAINLEVEL_OUTOF10: 7
PAINLEVEL_OUTOF10: 7

## 2024-07-05 ASSESSMENT — PAIN DESCRIPTION - LOCATION
LOCATION: KNEE
LOCATION: KNEE

## 2024-07-05 NOTE — ED TRIAGE NOTES
Pt states that she had an itching area on her left knee 3 days ago. Pt states that she woke up this morning with redness, pain, and hot to the touch.

## 2024-07-05 NOTE — ED NOTES
Reviewed discharge instructions with patient. Patient verbalized understanding. Ambulated out of the ED with a steady giat. No distress noted at this time

## 2024-07-05 NOTE — ED PROVIDER NOTES
Basic Information   Time Seen: 5:04 PM   Primary Care Provider: Ronni Cherry MD     Chief Complaint   Patient presents with    Knee Pain     Possible infection       HPI   Alis Moore is a 43 yrs female who presents with knee pain x 3 days. Patient reports that 3 days ago she noticed a dry spot on her left knee that was itchy and gradually since then this area has become painful, red and swollen. Patient is concerned about possible infection. Denies any fevers. Denies numbness or tingling.      Physical Exam  BP (!) 146/94 (07/05/24 1642)    Temp 98.3 °F (36.8 °C) (07/05/24 1637)    Pulse 82 (07/05/24 1637)   Resp 20 (07/05/24 1637)    SpO2 97 % (07/05/24 1637)       General: Awake and Alert, no acute distress   CV: RRR, S1, S2   Resp: LCTAB, even and non labored   Other: 5 cm area of swelling and erythema to left knee with dark spot in the middle. This area is hot to the touch and tender to palpation. ROM of left knee is intact. Pedal pulse intact.      Impression and Plan  Labs Reviewed   CBC WITH AUTO DIFFERENTIAL   COMPREHENSIVE METABOLIC PANEL        XR KNEE LEFT (3 VIEWS)    (Results Pending)      Final Impression   I have performed a medical screening exam on Alis Moore. Based on this patient's chief complaint/symptoms of   Chief Complaint   Patient presents with    Knee Pain     Possible infection     and my focused exam, their care will be started and transitioned to provider when room is available       Nancy Amezquita PA-C  07/05/24 6995    
  rOPINIRole (REQUIP) 5 MG tablet Take 4 mg by mouth daily     Afua Vitale MD   Loratadine 10 MG CAPS Take 10 mg by mouth daily    Afua Vitale MD   sertraline (ZOLOFT) 100 MG tablet Take 200 mg by mouth daily    Afua Vitale MD   naproxen (NAPROSYN) 500 MG tablet Take 1 tablet by mouth 2 times daily for 20 doses 11/10/16 11/20/16  Sukhdeep Pete DO       REVIEW OF SYSTEMS    (2-9 systems for level 4, 10 ormore for level 5)      Review of Systems   All other systems reviewed and are negative.      PHYSICAL EXAM   (up to 7 for level 4, 8 or more for level 5)      INITIAL VITALS:   /74   Pulse 82   Temp 98.3 °F (36.8 °C) (Oral)   Resp 20   Ht 1.676 m (5' 6\")   Wt 99.8 kg (220 lb)   LMP  (LMP Unknown)   SpO2 99%   BMI 35.51 kg/m²     Physical Exam  Constitutional:       General: She is not in acute distress.     Appearance: She is well-developed. She is not ill-appearing or toxic-appearing.   HENT:      Head: Normocephalic and atraumatic.   Eyes:      Conjunctiva/sclera: Conjunctivae normal.   Cardiovascular:      Rate and Rhythm: Normal rate and regular rhythm.      Pulses: Normal pulses.   Pulmonary:      Effort: Pulmonary effort is normal.      Breath sounds: Normal breath sounds.   Musculoskeletal:         General: Normal range of motion.      Cervical back: Normal range of motion and neck supple.      Right lower leg: No edema.      Left lower leg: No edema.      Comments: Full range of motion to the knee hip and ankle without any difficulties, no Baker's cyst no swelling to the knee no joint effusion, no pain to the knee aside from where the area of erythema is   Skin:     General: Skin is warm and dry.      Findings: Erythema and rash present.      Comments: Left anterior knee : focal area of erythema measuring 4 cm in diameter, well-circumscribed circular area with a small scab at the center that is about 2 mm, no fluctuance, it is blanchable erythema, there is no

## 2024-07-17 ENCOUNTER — APPOINTMENT (OUTPATIENT)
Dept: ORTHOPEDIC SURGERY | Facility: CLINIC | Age: 44
End: 2024-07-17

## 2024-07-23 ENCOUNTER — HOSPITAL ENCOUNTER (EMERGENCY)
Facility: HOSPITAL | Age: 44
Discharge: HOME | End: 2024-07-24
Attending: STUDENT IN AN ORGANIZED HEALTH CARE EDUCATION/TRAINING PROGRAM

## 2024-07-23 DIAGNOSIS — R51.9 ACUTE NONINTRACTABLE HEADACHE, UNSPECIFIED HEADACHE TYPE: Primary | ICD-10-CM

## 2024-07-23 DIAGNOSIS — J98.8 RESPIRATORY INFECTION: ICD-10-CM

## 2024-07-23 DIAGNOSIS — R05.1 ACUTE COUGH: ICD-10-CM

## 2024-07-23 LAB
ALBUMIN SERPL BCP-MCNC: 3.9 G/DL (ref 3.4–5)
ALP SERPL-CCNC: 62 U/L (ref 33–110)
ALT SERPL W P-5'-P-CCNC: 21 U/L (ref 7–45)
ANION GAP SERPL CALC-SCNC: 12 MMOL/L (ref 10–20)
AST SERPL W P-5'-P-CCNC: 20 U/L (ref 9–39)
BASOPHILS # BLD AUTO: 0.05 X10*3/UL (ref 0–0.1)
BASOPHILS NFR BLD AUTO: 0.8 %
BILIRUB SERPL-MCNC: 0.2 MG/DL (ref 0–1.2)
BUN SERPL-MCNC: 15 MG/DL (ref 6–23)
CALCIUM SERPL-MCNC: 9.1 MG/DL (ref 8.6–10.3)
CHLORIDE SERPL-SCNC: 106 MMOL/L (ref 98–107)
CO2 SERPL-SCNC: 23 MMOL/L (ref 21–32)
CREAT SERPL-MCNC: 0.76 MG/DL (ref 0.5–1.05)
EGFRCR SERPLBLD CKD-EPI 2021: >90 ML/MIN/1.73M*2
EOSINOPHIL # BLD AUTO: 0.15 X10*3/UL (ref 0–0.7)
EOSINOPHIL NFR BLD AUTO: 2.3 %
ERYTHROCYTE [DISTWIDTH] IN BLOOD BY AUTOMATED COUNT: 13.8 % (ref 11.5–14.5)
GLUCOSE SERPL-MCNC: 97 MG/DL (ref 74–99)
HCT VFR BLD AUTO: 39.1 % (ref 36–46)
HGB BLD-MCNC: 12.9 G/DL (ref 12–16)
IMM GRANULOCYTES # BLD AUTO: 0.01 X10*3/UL (ref 0–0.7)
IMM GRANULOCYTES NFR BLD AUTO: 0.2 % (ref 0–0.9)
LYMPHOCYTES # BLD AUTO: 1.02 X10*3/UL (ref 1.2–4.8)
LYMPHOCYTES NFR BLD AUTO: 15.7 %
MCH RBC QN AUTO: 30.2 PG (ref 26–34)
MCHC RBC AUTO-ENTMCNC: 33 G/DL (ref 32–36)
MCV RBC AUTO: 92 FL (ref 80–100)
MONOCYTES # BLD AUTO: 0.74 X10*3/UL (ref 0.1–1)
MONOCYTES NFR BLD AUTO: 11.4 %
NEUTROPHILS # BLD AUTO: 4.54 X10*3/UL (ref 1.2–7.7)
NEUTROPHILS NFR BLD AUTO: 69.6 %
NRBC BLD-RTO: 0 /100 WBCS (ref 0–0)
PLATELET # BLD AUTO: 233 X10*3/UL (ref 150–450)
POTASSIUM SERPL-SCNC: 3.6 MMOL/L (ref 3.5–5.3)
PROT SERPL-MCNC: 6.7 G/DL (ref 6.4–8.2)
RBC # BLD AUTO: 4.27 X10*6/UL (ref 4–5.2)
SARS-COV-2 RNA RESP QL NAA+PROBE: NOT DETECTED
SODIUM SERPL-SCNC: 137 MMOL/L (ref 136–145)
WBC # BLD AUTO: 6.5 X10*3/UL (ref 4.4–11.3)

## 2024-07-23 PROCEDURE — 84075 ASSAY ALKALINE PHOSPHATASE: CPT

## 2024-07-23 PROCEDURE — 36415 COLL VENOUS BLD VENIPUNCTURE: CPT

## 2024-07-23 PROCEDURE — 99284 EMERGENCY DEPT VISIT MOD MDM: CPT | Performed by: STUDENT IN AN ORGANIZED HEALTH CARE EDUCATION/TRAINING PROGRAM

## 2024-07-23 PROCEDURE — 2500000004 HC RX 250 GENERAL PHARMACY W/ HCPCS (ALT 636 FOR OP/ED)

## 2024-07-23 PROCEDURE — 85025 COMPLETE CBC W/AUTO DIFF WBC: CPT

## 2024-07-23 PROCEDURE — 96374 THER/PROPH/DIAG INJ IV PUSH: CPT

## 2024-07-23 PROCEDURE — 96375 TX/PRO/DX INJ NEW DRUG ADDON: CPT

## 2024-07-23 PROCEDURE — 96361 HYDRATE IV INFUSION ADD-ON: CPT

## 2024-07-23 PROCEDURE — 87635 SARS-COV-2 COVID-19 AMP PRB: CPT | Performed by: STUDENT IN AN ORGANIZED HEALTH CARE EDUCATION/TRAINING PROGRAM

## 2024-07-23 PROCEDURE — 99284 EMERGENCY DEPT VISIT MOD MDM: CPT | Mod: 25

## 2024-07-23 RX ORDER — BENZONATATE 100 MG/1
100 CAPSULE ORAL 3 TIMES DAILY PRN
Status: DISCONTINUED | OUTPATIENT
Start: 2024-07-23 | End: 2024-07-24 | Stop reason: HOSPADM

## 2024-07-23 RX ORDER — METOCLOPRAMIDE HYDROCHLORIDE 5 MG/ML
10 INJECTION INTRAMUSCULAR; INTRAVENOUS ONCE
Status: COMPLETED | OUTPATIENT
Start: 2024-07-23 | End: 2024-07-23

## 2024-07-23 RX ORDER — KETOROLAC TROMETHAMINE 15 MG/ML
15 INJECTION, SOLUTION INTRAMUSCULAR; INTRAVENOUS ONCE
Status: DISCONTINUED | OUTPATIENT
Start: 2024-07-23 | End: 2024-07-23

## 2024-07-23 RX ORDER — KETOROLAC TROMETHAMINE 15 MG/ML
15 INJECTION, SOLUTION INTRAMUSCULAR; INTRAVENOUS ONCE
Status: COMPLETED | OUTPATIENT
Start: 2024-07-23 | End: 2024-07-23

## 2024-07-23 RX ORDER — DIPHENHYDRAMINE HYDROCHLORIDE 50 MG/ML
25 INJECTION INTRAMUSCULAR; INTRAVENOUS ONCE
Status: COMPLETED | OUTPATIENT
Start: 2024-07-23 | End: 2024-07-23

## 2024-07-23 ASSESSMENT — PAIN DESCRIPTION - ONSET: ONSET: PROGRESSIVE

## 2024-07-23 ASSESSMENT — PAIN - FUNCTIONAL ASSESSMENT: PAIN_FUNCTIONAL_ASSESSMENT: 0-10

## 2024-07-23 ASSESSMENT — PAIN DESCRIPTION - LOCATION: LOCATION: HEAD

## 2024-07-23 ASSESSMENT — COLUMBIA-SUICIDE SEVERITY RATING SCALE - C-SSRS
1. IN THE PAST MONTH, HAVE YOU WISHED YOU WERE DEAD OR WISHED YOU COULD GO TO SLEEP AND NOT WAKE UP?: NO
2. HAVE YOU ACTUALLY HAD ANY THOUGHTS OF KILLING YOURSELF?: NO
6. HAVE YOU EVER DONE ANYTHING, STARTED TO DO ANYTHING, OR PREPARED TO DO ANYTHING TO END YOUR LIFE?: NO

## 2024-07-23 ASSESSMENT — PAIN DESCRIPTION - FREQUENCY: FREQUENCY: CONSTANT/CONTINUOUS

## 2024-07-23 ASSESSMENT — PAIN DESCRIPTION - PAIN TYPE: TYPE: ACUTE PAIN

## 2024-07-23 ASSESSMENT — PAIN DESCRIPTION - DESCRIPTORS: DESCRIPTORS: HEADACHE

## 2024-07-23 ASSESSMENT — PAIN DESCRIPTION - ORIENTATION: ORIENTATION: OTHER (COMMENT)

## 2024-07-23 ASSESSMENT — PAIN DESCRIPTION - PROGRESSION: CLINICAL_PROGRESSION: GRADUALLY WORSENING

## 2024-07-23 ASSESSMENT — PAIN SCALES - GENERAL: PAINLEVEL_OUTOF10: 8

## 2024-07-23 NOTE — Clinical Note
Елена Vaughn was seen and treated in our emergency department on 7/23/2024.  She may return to work on 07/26/2024.       If you have any questions or concerns, please don't hesitate to call.      Arvind Hinds MD

## 2024-07-24 VITALS
OXYGEN SATURATION: 96 % | WEIGHT: 230 LBS | RESPIRATION RATE: 17 BRPM | TEMPERATURE: 97.3 F | BODY MASS INDEX: 42.33 KG/M2 | HEART RATE: 100 BPM | HEIGHT: 62 IN | SYSTOLIC BLOOD PRESSURE: 138 MMHG | DIASTOLIC BLOOD PRESSURE: 95 MMHG

## 2024-07-24 PROCEDURE — 2500000001 HC RX 250 WO HCPCS SELF ADMINISTERED DRUGS (ALT 637 FOR MEDICARE OP)

## 2024-07-24 RX ORDER — PSEUDOEPHEDRINE HCL 30 MG
30 TABLET ORAL EVERY 6 HOURS PRN
Qty: 20 TABLET | Refills: 0 | Status: SHIPPED | OUTPATIENT
Start: 2024-07-24 | End: 2024-07-31

## 2024-07-24 RX ORDER — PSEUDOEPHEDRINE HCL 120 MG/1
120 TABLET, FILM COATED, EXTENDED RELEASE ORAL ONCE
Status: COMPLETED | OUTPATIENT
Start: 2024-07-24 | End: 2024-07-24

## 2024-07-24 ASSESSMENT — PAIN - FUNCTIONAL ASSESSMENT
PAIN_FUNCTIONAL_ASSESSMENT: 0-10
PAIN_FUNCTIONAL_ASSESSMENT: 0-10

## 2024-07-24 ASSESSMENT — PAIN SCALES - GENERAL
PAINLEVEL_OUTOF10: 0 - NO PAIN
PAINLEVEL_OUTOF10: 0 - NO PAIN

## 2024-07-24 NOTE — DISCHARGE INSTRUCTIONS
Follow-up with your PCP.  Take Sudafed for 10 days.  Return to the ER for any persistent or worsening symptoms.  Stay hydrated.  Alternate Tylenol and Motrin 1 pill every 8 hours for your headache.

## 2024-07-24 NOTE — ED PROVIDER NOTES
HPI   Chief Complaint   Patient presents with    Headache     Headache, chest congestion, cough x3 days        HPI    43-year-old female patient arriving with headache since 4 PM, cough since yesterday along with stuffy nose states that she had a fever of 100.6.  Took Tylenol at 6 PM then took NyQuil at 8:30 PM denies any dyspnea.  Has history of migraines on Maxalt.      Patient History   Past Medical History:   Diagnosis Date    Abnormal weight gain     Weight gain    Ataxic gait     Ataxic gait    Cough 03/06/2023    Fever 03/06/2023    Left wrist pain 03/06/2023    Malignant (primary) neoplasm, unspecified (Multi)     Cancer    Other intervertebral disc degeneration, lumbar region     Disc degeneration, lumbar    Other specific joint derangements of unspecified joint, not elsewhere classified     Joint crepitus    Paresthesia of skin     Tingling    Personal history of other diseases of the female genital tract     History of polycystic ovarian syndrome    Personal history of other diseases of the musculoskeletal system and connective tissue     History of low back pain    Personal history of other diseases of the nervous system and sense organs     History of migraine headaches    Personal history of other endocrine, nutritional and metabolic disease 08/01/2019    History of iron deficiency    Postmenopausal atrophic vaginitis 08/01/2019    Atrophic vaginitis    Sinobronchitis 03/06/2023    Sinus pain 03/06/2023     Past Surgical History:   Procedure Laterality Date    HERNIA REPAIR  07/19/2013    Hernia Repair    OTHER SURGICAL HISTORY  08/01/2019    Surgery     Family History   Problem Relation Name Age of Onset    No Known Problems Mother      No Known Problems Father       Social History     Tobacco Use    Smoking status: Former     Types: Cigarettes    Smokeless tobacco: Never   Vaping Use    Vaping status: Never Used   Substance Use Topics    Alcohol use: Yes     Comment: rare    Drug use: Never        Physical Exam   ED Triage Vitals [07/23/24 2232]   Temperature Heart Rate Respirations BP   36.3 °C (97.3 °F) 98 18 138/78      Pulse Ox Temp Source Heart Rate Source Patient Position   96 % Temporal Monitor Sitting      BP Location FiO2 (%)     Right arm --       Physical Exam    General: Alert, no acute distress, well developed, Well hydrated  Head: NCAT  Eyes: Clear conjunctiva, EOMI  ENT: Moist mucosa, no lymphadenopathy  Respiratory: Cough noted, symmetric aeration.  Wheezes, rales, or Rhonchi.  CV: Normal rhythm, Normal Rate, pulses present bilaterally  GI: Soft, NT, no RBT, no guarding, No distention  : no flank tenderness, no cva tenderness  MSK: Atraumatic. Full ROM in extremities. Bilateral symmetric intact strength. No pedal edema.  Skin: Warm, c/d/i  Neuro: AOx3, facial symmetry,   sensorimotor intact in extremities,   CN intact, Nonfocal neurological exam    ED Course & MDM   Diagnoses as of 07/24/24 0008   Acute nonintractable headache, unspecified headache type   Acute cough   Respiratory infection                       Hollister Coma Scale Score: 15                        Medical Decision Making      43-year-old female patient arriving with headache since 4 PM, cough since yesterday along with stuffy nose states that she had a fever of 100.6.  Took Tylenol at 6 PM then took NyQuil at 8:30 PM denies any dyspnea.  Has history of migraines on Maxalt.  The patient was treated with Benadryl, Reglan, Toradol for her headache.  She was also tested for COVID.  Lactated Ringer's was ordered as well for possible dehydration or headache.  She denied any neck stiffness and is afebrile.  The presence of more likely diagnosis of upper respiratory viral infection is more likely than meningitis.  LP was not pursued at this time.  Likely diagnosis is upper viral respiratory infection for which she was sent with prescription for Sudafed and told to take Motrin and Tylenol as well.  Discharged in stable condition  with return precautions and anticipatory guidance pending completion of IV fluids. The patient was signed out to the next resident.      External Records Reviewed: I reviewed recent and relevant outside records including: Prior  Independent Interpretation of Studies: I independently interpreted: As above  Social Determinants Affecting Care: Diagnostic testing considered: As above    I reviewed the case with the attending ER physician. Patient and/or patient´s representative was counseled regarding labs, imaging, likely diagnosis, and plan.     Arvind Hinds MD MS  Emergency Medicine    The above documentation was completed with the use of speech recognition software. It may contain dictation errors secondary to limitations of the software.        Arvind Hinds MD  Resident  07/23/24 3213       Arvind Hinds MD  Resident  07/24/24 0024

## 2024-08-17 ENCOUNTER — HOSPITAL ENCOUNTER (EMERGENCY)
Facility: HOSPITAL | Age: 44
Discharge: HOME | End: 2024-08-17
Attending: EMERGENCY MEDICINE

## 2024-08-17 VITALS
DIASTOLIC BLOOD PRESSURE: 74 MMHG | OXYGEN SATURATION: 96 % | SYSTOLIC BLOOD PRESSURE: 125 MMHG | HEART RATE: 80 BPM | WEIGHT: 225 LBS | HEIGHT: 63 IN | BODY MASS INDEX: 39.87 KG/M2 | RESPIRATION RATE: 18 BRPM | TEMPERATURE: 97.7 F

## 2024-08-17 DIAGNOSIS — L02.91 ABSCESS: Primary | ICD-10-CM

## 2024-08-17 PROCEDURE — 99283 EMERGENCY DEPT VISIT LOW MDM: CPT

## 2024-08-17 PROCEDURE — 2500000002 HC RX 250 W HCPCS SELF ADMINISTERED DRUGS (ALT 637 FOR MEDICARE OP, ALT 636 FOR OP/ED)

## 2024-08-17 PROCEDURE — 99284 EMERGENCY DEPT VISIT MOD MDM: CPT | Performed by: EMERGENCY MEDICINE

## 2024-08-17 PROCEDURE — 96372 THER/PROPH/DIAG INJ SC/IM: CPT

## 2024-08-17 PROCEDURE — 2500000004 HC RX 250 GENERAL PHARMACY W/ HCPCS (ALT 636 FOR OP/ED)

## 2024-08-17 RX ORDER — SULFAMETHOXAZOLE AND TRIMETHOPRIM 800; 160 MG/1; MG/1
1 TABLET ORAL 2 TIMES DAILY
Qty: 14 TABLET | Refills: 0 | Status: SHIPPED | OUTPATIENT
Start: 2024-08-17 | End: 2024-08-24

## 2024-08-17 RX ORDER — LIDOCAINE HYDROCHLORIDE AND EPINEPHRINE 10; 10 MG/ML; UG/ML
10 INJECTION, SOLUTION INFILTRATION; PERINEURAL ONCE
Status: DISCONTINUED | OUTPATIENT
Start: 2024-08-17 | End: 2024-08-17

## 2024-08-17 RX ORDER — KETOROLAC TROMETHAMINE 30 MG/ML
30 INJECTION, SOLUTION INTRAMUSCULAR; INTRAVENOUS ONCE
Status: COMPLETED | OUTPATIENT
Start: 2024-08-17 | End: 2024-08-17

## 2024-08-17 RX ORDER — SULFAMETHOXAZOLE AND TRIMETHOPRIM 800; 160 MG/1; MG/1
1 TABLET ORAL ONCE
Status: COMPLETED | OUTPATIENT
Start: 2024-08-17 | End: 2024-08-17

## 2024-08-17 RX ADMIN — SULFAMETHOXAZOLE AND TRIMETHOPRIM 1 TABLET: 800; 160 TABLET ORAL at 23:23

## 2024-08-17 RX ADMIN — KETOROLAC TROMETHAMINE 30 MG: 30 INJECTION, SOLUTION INTRAMUSCULAR at 23:23

## 2024-08-17 ASSESSMENT — PAIN - FUNCTIONAL ASSESSMENT
PAIN_FUNCTIONAL_ASSESSMENT: 0-10
PAIN_FUNCTIONAL_ASSESSMENT: 0-10

## 2024-08-17 ASSESSMENT — PAIN DESCRIPTION - PAIN TYPE: TYPE: ACUTE PAIN

## 2024-08-17 ASSESSMENT — COLUMBIA-SUICIDE SEVERITY RATING SCALE - C-SSRS
2. HAVE YOU ACTUALLY HAD ANY THOUGHTS OF KILLING YOURSELF?: NO
1. IN THE PAST MONTH, HAVE YOU WISHED YOU WERE DEAD OR WISHED YOU COULD GO TO SLEEP AND NOT WAKE UP?: NO
6. HAVE YOU EVER DONE ANYTHING, STARTED TO DO ANYTHING, OR PREPARED TO DO ANYTHING TO END YOUR LIFE?: NO

## 2024-08-17 ASSESSMENT — PAIN DESCRIPTION - LOCATION: LOCATION: BREAST

## 2024-08-17 ASSESSMENT — LIFESTYLE VARIABLES
EVER HAD A DRINK FIRST THING IN THE MORNING TO STEADY YOUR NERVES TO GET RID OF A HANGOVER: NO
TOTAL SCORE: 0
HAVE PEOPLE ANNOYED YOU BY CRITICIZING YOUR DRINKING: NO
EVER FELT BAD OR GUILTY ABOUT YOUR DRINKING: NO
HAVE YOU EVER FELT YOU SHOULD CUT DOWN ON YOUR DRINKING: NO

## 2024-08-17 ASSESSMENT — PAIN SCALES - GENERAL
PAINLEVEL_OUTOF10: 3
PAINLEVEL_OUTOF10: 8

## 2024-08-17 ASSESSMENT — PAIN DESCRIPTION - ORIENTATION: ORIENTATION: LEFT

## 2024-08-18 NOTE — ED PROVIDER NOTES
EMERGENCY DEPARTMENT ENCOUNTER      Pt Name: Елена Vaughn  MRN: [id number]  Birthdate 1980  Date of evaluation: 8/17/2024  Provider: Pieter Lake DO    CHIEF COMPLAINT       Chief Complaint   Patient presents with    Boil under left breast         HISTORY OF PRESENT ILLNESS    Patient is a 43-year-old female with past medical history of PCOS, depression, migraines, and a prior MRSA infection that required surgical excision and a 3-day hospital stay.  She presents to the ED with a cyst under her left breast.  She thinks it has been there for about 2 to 3 days but she did not really notice it until last night while she was showering.  She states that it did have a sanguinous and purulent drainage yesterday.  It is painful she has not found anything that made it better.  Any amount of movement results and worsening pain.  She denies any fevers, arm swelling, chest pain, shortness of breath, abdominal pain, nausea or vomiting.  She states that when she had the prior MRSA infection it presented in the same way as the cyst and rapidly expanded within 3 days.      History provided by:  Patient   used: No        Nursing Notes were reviewed.    PAST MEDICAL HISTORY     Past Medical History:   Diagnosis Date    Abnormal weight gain     Weight gain    Ataxic gait     Ataxic gait    Cough 03/06/2023    Fever 03/06/2023    Left wrist pain 03/06/2023    Malignant (primary) neoplasm, unspecified (Multi)     Cancer    Other intervertebral disc degeneration, lumbar region     Disc degeneration, lumbar    Other specific joint derangements of unspecified joint, not elsewhere classified     Joint crepitus    Paresthesia of skin     Tingling    Personal history of other diseases of the female genital tract     History of polycystic ovarian syndrome    Personal history of other diseases of the musculoskeletal system and connective tissue     History of low back pain    Personal history of other diseases of the  nervous system and sense organs     History of migraine headaches    Personal history of other endocrine, nutritional and metabolic disease 08/01/2019    History of iron deficiency    Postmenopausal atrophic vaginitis 08/01/2019    Atrophic vaginitis    Sinobronchitis 03/06/2023    Sinus pain 03/06/2023         SURGICAL HISTORY       Past Surgical History:   Procedure Laterality Date    HERNIA REPAIR  07/19/2013    Hernia Repair    OTHER SURGICAL HISTORY  08/01/2019    Surgery         CURRENT MEDICATIONS       Discharge Medication List as of 8/17/2024 11:20 PM        CONTINUE these medications which have NOT CHANGED    Details   albuterol 90 mcg/actuation inhaler Inhale 2 puffs every 4 hours if needed for wheezing. cough, Historical Med      amoxicillin-pot clavulanate (Augmentin) 875-125 mg tablet Take 1 tablet (875 mg) by mouth every 12 hours., Starting Mon 2/5/2024, Normal      DULoxetine (Cymbalta) 60 mg DR capsule Take 1 capsule (60 mg) by mouth in the morning and 1 capsule (60 mg) before bedtime., Starting Mon 4/10/2023, Normal      ergocalciferol (Vitamin D-2) 1.25 MG (33193 UT) capsule Take 1 capsule (50,000 Units) by mouth 1 (one) time per week., Starting Mon 4/10/2023, Normal      estradiol (Climara) 0.05 mg/24 hr patch APPLY 1 PATCH WEEKLY AS DIRECTED., Starting Thu 7/28/2022, Historical Med      estradiol (Climara) 0.075 mg/24 hr patch apply 1 patch every week as directed, Starting Thu 7/28/2022, Historical Med      fluticasone prp-sod.chl,bicarb 50 mcg- 0.9 % kit,spray suspension and spray Administer 2 sprays into each nostril once daily., Historical Med      hyalur ac-chond sul-colg II-AA (Hyaluronic Acid, chond-collgn,) 40- mg capsule Take 1 capsule by mouth once daily., Starting Wed 6/29/2022, Historical Med      loratadine (Claritin) 10 mg tablet Take 1 tablet (10 mg) by mouth once daily at bedtime., Starting Mon 4/10/2023, Normal      naproxen (Naprosyn) 500 mg tablet Take 1 tablet (500 mg)  by mouth every 12 hours if needed for mild pain (1 - 3) or moderate pain (4 - 6)., Starting Tue 6/18/2024, Normal      omeprazole (PriLOSEC) 40 mg DR capsule Take 1 capsule (40 mg) by mouth once daily., Starting Mon 4/10/2023, Normal      progesterone (Prometrium) 100 mg capsule Take 1 capsule (100 mg) by mouth once daily., Starting Wed 6/29/2022, Historical Med      pseudoephedrine (Sudafed) 30 mg tablet Take 1 tablet (30 mg) by mouth every 6 hours if needed for congestion for up to 7 days., Starting Wed 7/24/2024, Until Wed 7/31/2024 at 2359, Normal      rizatriptan MLT (Maxalt-MLT) 10 mg disintegrating tablet TAKE 1 TABLET AT ONSET OF HEADACHE. MAY REPEAT EVERY 2 HOURS AS NEEDED. MAXIMUM 3 TABLETS IN 24 HOURS., Historical Med      rOPINIRole (Requip) 4 mg tablet Take 1 tablet (4 mg) by mouth once daily at bedtime., Starting Mon 4/10/2023, Normal      topiramate (Topamax) 50 mg tablet Take 1 tablet (50 mg) by mouth in the morning and 1 tablet (50 mg) before bedtime., Starting Mon 4/10/2023, Normal             ALLERGIES     Ciprofloxacin, Hydrocodone-acetaminophen, Other, Vancomycin, and Hydrocodone-guaifenesin    FAMILY HISTORY       Family History   Problem Relation Name Age of Onset    No Known Problems Mother      No Known Problems Father            SOCIAL HISTORY       Social History     Socioeconomic History    Marital status: Single   Tobacco Use    Smoking status: Former     Types: Cigarettes    Smokeless tobacco: Never   Vaping Use    Vaping status: Never Used   Substance and Sexual Activity    Alcohol use: Yes     Comment: rare    Drug use: Never       SCREENINGS                        PHYSICAL EXAM    (up to 7 for level 4, 8 or more for level 5)     ED Triage Vitals [08/17/24 2125]   Temperature Heart Rate Respirations BP   36.5 °C (97.7 °F) 82 20 129/72      Pulse Ox Temp src Heart Rate Source Patient Position   95 % -- -- --      BP Location FiO2 (%)     -- --       Physical Exam  Vitals reviewed.    Constitutional:       General: She is not in acute distress.     Appearance: She is not ill-appearing or toxic-appearing.   HENT:      Head: Normocephalic and atraumatic.      Right Ear: External ear normal.      Left Ear: External ear normal.      Nose: Nose normal.   Eyes:      Conjunctiva/sclera: Conjunctivae normal.   Cardiovascular:      Rate and Rhythm: Normal rate and regular rhythm.      Pulses: Normal pulses.           Radial pulses are 2+ on the right side and 2+ on the left side.      Heart sounds: Normal heart sounds. No murmur heard.  Pulmonary:      Effort: Pulmonary effort is normal.      Breath sounds: Normal breath sounds. No wheezing or rhonchi.   Abdominal:      General: Abdomen is flat.      Palpations: Abdomen is soft.      Tenderness: There is no abdominal tenderness.   Musculoskeletal:      Cervical back: Normal range of motion.   Skin:     General: Skin is warm and dry.      Capillary Refill: Capillary refill takes less than 2 seconds.      Findings: Abscess (under L breast about 1/2 inch in diameter with tract palpated) and erythema present. No petechiae. Rash is not papular or purpuric.   Neurological:      General: No focal deficit present.      Mental Status: She is alert.   Psychiatric:         Mood and Affect: Mood normal.         Behavior: Behavior normal.          DIAGNOSTIC RESULTS     LABS:  Labs Reviewed - No data to display    All other labs were within normal range or not returned as of this dictation.    Imaging  No orders to display        Procedures  Procedures     EMERGENCY DEPARTMENT COURSE/MDM:   Medical Decision Making  Patient is a 43-year-old female with a past medical history of an abdominal MRSA infection that resulted in excision of an abscess and a 3-day hospital stay.  She is presenting today with an abscess under her left breast that started 2 or 3 days ago.  She did not really notice until yesterday evening when she was showering.  She did have some purulent and  sanguinous discharge.  She states that when she had the abdominal MRSA infection it presented the same way as this and then rapidly expanded within 3 days.  She denies any fevers, arm swelling, chest pain, shortness of breath, abdominal pain, or nausea and vomiting. She is hemodynamically stable and vital signs are within normal limits.  On exam she appears generally well.  There was a half a abscess identified under her left breast with a tract palpated.  It was warm but no excessive swelling was noted.  No crepitus was felt.  She had 2+ radial pulses.  No axillary or cervical lymphadenopathy was noted..    Differential diagnoses include but are not limited to abscess, cyst, hidradenitis suppurativa, cellulitis, and necrotizing fasciitis. Very minimal amount of fluid collection on bedside ultrasound, not large enough amenable to incision and drainage.  She will be given a prescription for Bactrim and was instructed to use warm compresses with it.  She was given strict return precautions.  She does not need any admission at this time as there does not appear to be any need for IV antibiotics. Home care and return instructions discussed. Patient expressed understanding and agreement. Patient discharged in stable condition.    Pieter Lake DO, PGY-4  Emergency Medicine Resident    Amount and/or Complexity of Data Reviewed  External Data Reviewed: notes.     Details: Recent ED discharge note reviewed in which she presented with a headache and a viral syndrome.  Labs: ordered. Decision-making details documented in ED Course.        Please see ED course for additional MDM.    Diagnoses as of 08/18/24 0653   Abscess        No orders to display       Patient and or family in agreement and understanding of treatment plan.  All questions answered.      I reviewed the case with the attending ED physician. The attending ED physician agrees with the plan. Patient and/or patient´s representative was counseled regarding labs,  imaging, likely diagnosis, and plan. All questions were answered.    ED Medications administered this visit:    Medications   ketorolac (Toradol) injection 30 mg (30 mg intramuscular Given 8/17/24 2323)   sulfamethoxazole-trimethoprim (Bactrim DS) 800-160 mg per tablet 1 tablet (1 tablet oral Given 8/17/24 2323)       New Prescriptions from this visit:    Discharge Medication List as of 8/17/2024 11:20 PM        START taking these medications    Details   sulfamethoxazole-trimethoprim (Bactrim DS) 800-160 mg tablet Take 1 tablet by mouth 2 times a day for 7 days., Starting Sat 8/17/2024, Until Sat 8/24/2024, Normal             Follow-up:  Wily Paige MD  [id number]  [place] [id number] [phone]    On 8/19/2024          Final Impression:   1. Abscess          (Please note that portions of this note were completed with a voice recognition program.  Efforts were made to edit the dictations but occasionally words are mis-transcribed.)       Pieter Lake DO  Resident  08/18/24 0653       Pieter Lake DO  Resident  08/18/24 0655

## 2024-08-18 NOTE — DISCHARGE INSTRUCTIONS
Take the full course of the Bactrim even he may start to feel better.  Please follow-up with your primary care doctor in 2 to 3 days.  Return to the emergency department if you have any fevers, worsening of the redness or infection, review of any questions or concerns.  Apply warm compresses to the area 3-4 times daily.

## 2024-12-10 PROCEDURE — 81001 URINALYSIS AUTO W/SCOPE: CPT | Performed by: STUDENT IN AN ORGANIZED HEALTH CARE EDUCATION/TRAINING PROGRAM

## 2024-12-10 PROCEDURE — 87186 SC STD MICRODIL/AGAR DIL: CPT | Mod: STJLAB | Performed by: STUDENT IN AN ORGANIZED HEALTH CARE EDUCATION/TRAINING PROGRAM

## 2024-12-11 ENCOUNTER — HOSPITAL ENCOUNTER (EMERGENCY)
Facility: HOSPITAL | Age: 44
Discharge: HOME | End: 2024-12-11
Attending: STUDENT IN AN ORGANIZED HEALTH CARE EDUCATION/TRAINING PROGRAM

## 2024-12-11 ENCOUNTER — APPOINTMENT (OUTPATIENT)
Dept: RADIOLOGY | Facility: HOSPITAL | Age: 44
End: 2024-12-11

## 2024-12-11 VITALS
DIASTOLIC BLOOD PRESSURE: 69 MMHG | SYSTOLIC BLOOD PRESSURE: 126 MMHG | BODY MASS INDEX: 39.27 KG/M2 | HEART RATE: 76 BPM | HEIGHT: 64 IN | RESPIRATION RATE: 18 BRPM | OXYGEN SATURATION: 97 % | TEMPERATURE: 97.3 F | WEIGHT: 230 LBS

## 2024-12-11 DIAGNOSIS — N30.00 ACUTE CYSTITIS WITHOUT HEMATURIA: Primary | ICD-10-CM

## 2024-12-11 LAB
ALBUMIN SERPL BCP-MCNC: 4.3 G/DL (ref 3.4–5)
ALP SERPL-CCNC: 69 U/L (ref 33–110)
ALT SERPL W P-5'-P-CCNC: 19 U/L (ref 7–45)
ANION GAP SERPL CALC-SCNC: 12 MMOL/L (ref 10–20)
APPEARANCE UR: ABNORMAL
AST SERPL W P-5'-P-CCNC: 20 U/L (ref 9–39)
BACTERIA #/AREA URNS AUTO: ABNORMAL /HPF
BASOPHILS # BLD AUTO: 0.07 X10*3/UL (ref 0–0.1)
BASOPHILS NFR BLD AUTO: 0.6 %
BILIRUB SERPL-MCNC: 0.4 MG/DL (ref 0–1.2)
BILIRUB UR STRIP.AUTO-MCNC: NEGATIVE MG/DL
BUN SERPL-MCNC: 19 MG/DL (ref 6–23)
CALCIUM SERPL-MCNC: 9.2 MG/DL (ref 8.6–10.3)
CHLORIDE SERPL-SCNC: 108 MMOL/L (ref 98–107)
CO2 SERPL-SCNC: 23 MMOL/L (ref 21–32)
COLOR UR: ABNORMAL
CREAT SERPL-MCNC: 0.73 MG/DL (ref 0.5–1.05)
EGFRCR SERPLBLD CKD-EPI 2021: >90 ML/MIN/1.73M*2
EOSINOPHIL # BLD AUTO: 0.23 X10*3/UL (ref 0–0.7)
EOSINOPHIL NFR BLD AUTO: 2.1 %
ERYTHROCYTE [DISTWIDTH] IN BLOOD BY AUTOMATED COUNT: 13.3 % (ref 11.5–14.5)
GLUCOSE SERPL-MCNC: 103 MG/DL (ref 74–99)
GLUCOSE UR STRIP.AUTO-MCNC: NORMAL MG/DL
HCG UR QL IA.RAPID: NEGATIVE
HCT VFR BLD AUTO: 42.3 % (ref 36–46)
HGB BLD-MCNC: 13.6 G/DL (ref 12–16)
HOLD SPECIMEN: NORMAL
IMM GRANULOCYTES # BLD AUTO: 0.03 X10*3/UL (ref 0–0.7)
IMM GRANULOCYTES NFR BLD AUTO: 0.3 % (ref 0–0.9)
KETONES UR STRIP.AUTO-MCNC: NEGATIVE MG/DL
LEUKOCYTE ESTERASE UR QL STRIP.AUTO: ABNORMAL
LYMPHOCYTES # BLD AUTO: 2.73 X10*3/UL (ref 1.2–4.8)
LYMPHOCYTES NFR BLD AUTO: 25.3 %
MCH RBC QN AUTO: 29.8 PG (ref 26–34)
MCHC RBC AUTO-ENTMCNC: 32.2 G/DL (ref 32–36)
MCV RBC AUTO: 93 FL (ref 80–100)
MONOCYTES # BLD AUTO: 0.91 X10*3/UL (ref 0.1–1)
MONOCYTES NFR BLD AUTO: 8.4 %
NEUTROPHILS # BLD AUTO: 6.8 X10*3/UL (ref 1.2–7.7)
NEUTROPHILS NFR BLD AUTO: 63.3 %
NITRITE UR QL STRIP.AUTO: NEGATIVE
NRBC BLD-RTO: 0 /100 WBCS (ref 0–0)
PH UR STRIP.AUTO: 6 [PH]
PLATELET # BLD AUTO: 302 X10*3/UL (ref 150–450)
POTASSIUM SERPL-SCNC: 4.2 MMOL/L (ref 3.5–5.3)
PROT SERPL-MCNC: 7.4 G/DL (ref 6.4–8.2)
PROT UR STRIP.AUTO-MCNC: ABNORMAL MG/DL
RBC # BLD AUTO: 4.56 X10*6/UL (ref 4–5.2)
RBC # UR STRIP.AUTO: ABNORMAL /UL
RBC #/AREA URNS AUTO: >20 /HPF
SODIUM SERPL-SCNC: 139 MMOL/L (ref 136–145)
SP GR UR STRIP.AUTO: 1.02
SQUAMOUS #/AREA URNS AUTO: ABNORMAL /HPF
UROBILINOGEN UR STRIP.AUTO-MCNC: NORMAL MG/DL
WBC # BLD AUTO: 10.8 X10*3/UL (ref 4.4–11.3)
WBC #/AREA URNS AUTO: >50 /HPF

## 2024-12-11 PROCEDURE — 2500000001 HC RX 250 WO HCPCS SELF ADMINISTERED DRUGS (ALT 637 FOR MEDICARE OP): Performed by: STUDENT IN AN ORGANIZED HEALTH CARE EDUCATION/TRAINING PROGRAM

## 2024-12-11 PROCEDURE — 80053 COMPREHEN METABOLIC PANEL: CPT

## 2024-12-11 PROCEDURE — 74177 CT ABD & PELVIS W/CONTRAST: CPT | Performed by: RADIOLOGY

## 2024-12-11 PROCEDURE — 85025 COMPLETE CBC W/AUTO DIFF WBC: CPT

## 2024-12-11 PROCEDURE — 81025 URINE PREGNANCY TEST: CPT | Performed by: STUDENT IN AN ORGANIZED HEALTH CARE EDUCATION/TRAINING PROGRAM

## 2024-12-11 PROCEDURE — 36415 COLL VENOUS BLD VENIPUNCTURE: CPT

## 2024-12-11 PROCEDURE — 2550000001 HC RX 255 CONTRASTS: Performed by: STUDENT IN AN ORGANIZED HEALTH CARE EDUCATION/TRAINING PROGRAM

## 2024-12-11 PROCEDURE — 74177 CT ABD & PELVIS W/CONTRAST: CPT

## 2024-12-11 PROCEDURE — 99285 EMERGENCY DEPT VISIT HI MDM: CPT | Mod: 25 | Performed by: STUDENT IN AN ORGANIZED HEALTH CARE EDUCATION/TRAINING PROGRAM

## 2024-12-11 RX ORDER — CEPHALEXIN 500 MG/1
500 CAPSULE ORAL 2 TIMES DAILY
Qty: 20 CAPSULE | Refills: 0 | Status: SHIPPED | OUTPATIENT
Start: 2024-12-11 | End: 2024-12-21

## 2024-12-11 RX ORDER — CEPHALEXIN 500 MG/1
500 CAPSULE ORAL ONCE
Status: COMPLETED | OUTPATIENT
Start: 2024-12-11 | End: 2024-12-11

## 2024-12-11 ASSESSMENT — PAIN SCALES - GENERAL: PAINLEVEL_OUTOF10: 7

## 2024-12-11 ASSESSMENT — PAIN DESCRIPTION - FREQUENCY: FREQUENCY: CONSTANT/CONTINUOUS

## 2024-12-11 ASSESSMENT — PAIN DESCRIPTION - PROGRESSION: CLINICAL_PROGRESSION: NOT CHANGED

## 2024-12-11 ASSESSMENT — PAIN DESCRIPTION - ONSET: ONSET: SUDDEN

## 2024-12-11 ASSESSMENT — PAIN DESCRIPTION - LOCATION: LOCATION: OTHER (COMMENT)

## 2024-12-11 ASSESSMENT — PAIN DESCRIPTION - DESCRIPTORS: DESCRIPTORS: PRESSURE

## 2024-12-11 ASSESSMENT — PAIN - FUNCTIONAL ASSESSMENT: PAIN_FUNCTIONAL_ASSESSMENT: 0-10

## 2024-12-11 ASSESSMENT — PAIN DESCRIPTION - PAIN TYPE: TYPE: ACUTE PAIN

## 2024-12-11 NOTE — DISCHARGE INSTRUCTIONS
If you continuously have vaginal spotting recommend you follow-up with gynecology in regards to this ED visit.  If you do not have 1 I have listed a gynecologist above.  Also recommend following up with your primary care provider as soon as you have insurance.  If you have any worsening symptoms worsening fevers worsening abdominal pain please return to the ED for reevaluation.

## 2024-12-11 NOTE — ED PROVIDER NOTES
EMERGENCY DEPARTMENT ENCOUNTER      Pt Name: Елена Vaughn  MRN: [id number]  Birthdate 1980  Date of evaluation: 12/11/2024    HISTORY OF PRESENT ILLNESS    Елена Vaughn is an 44 y.o. female with history including anal adenocarcinoma in remission, menopausal presenting to the emergency department for urinary symptoms.  Patient states that she started develop increased urgency and increased frequency 3 days ago.  Symptoms have progressively worsened.  She has tried to stay hydrated but no improvement.  She denies any dysuria but does have a lot of pressure with urination.  Patient also noted a little bit of red spotting which she believes is vaginal in origin.  Patient is menopausal secondary to her chemoradiation.  She is in remission for the last 5 years.  She has no diarrhea, constipation abdominal pain.  She is has this frequent urge to go to the bathroom.      PAST MEDICAL HISTORY     Past Medical History:   Diagnosis Date    Abnormal weight gain     Weight gain    Ataxic gait     Ataxic gait    Cough 03/06/2023    Fever 03/06/2023    Left wrist pain 03/06/2023    Malignant (primary) neoplasm, unspecified (Multi)     Cancer    Other intervertebral disc degeneration, lumbar region     Disc degeneration, lumbar    Other specific joint derangements of unspecified joint, not elsewhere classified     Joint crepitus    Paresthesia of skin     Tingling    Personal history of other diseases of the female genital tract     History of polycystic ovarian syndrome    Personal history of other diseases of the musculoskeletal system and connective tissue     History of low back pain    Personal history of other diseases of the nervous system and sense organs     History of migraine headaches    Personal history of other endocrine, nutritional and metabolic disease 08/01/2019    History of iron deficiency    Postmenopausal atrophic vaginitis 08/01/2019    Atrophic vaginitis    Sinobronchitis 03/06/2023    Sinus pain  03/06/2023       SURGICAL HISTORY       Past Surgical History:   Procedure Laterality Date    HERNIA REPAIR  07/19/2013    Hernia Repair    OTHER SURGICAL HISTORY  08/01/2019    Surgery       CURRENT MEDICATIONS       Previous Medications    ALBUTEROL 90 MCG/ACTUATION INHALER    Inhale 2 puffs every 4 hours if needed for wheezing. cough    AMOXICILLIN-POT CLAVULANATE (AUGMENTIN) 875-125 MG TABLET    Take 1 tablet (875 mg) by mouth every 12 hours.    DULOXETINE (CYMBALTA) 60 MG DR CAPSULE    Take 1 capsule (60 mg) by mouth in the morning and 1 capsule (60 mg) before bedtime.    ERGOCALCIFEROL (VITAMIN D-2) 1.25 MG (86551 UT) CAPSULE    Take 1 capsule (50,000 Units) by mouth 1 (one) time per week.    ESTRADIOL (CLIMARA) 0.05 MG/24 HR PATCH    APPLY 1 PATCH WEEKLY AS DIRECTED.    ESTRADIOL (CLIMARA) 0.075 MG/24 HR PATCH    apply 1 patch every week as directed    FLUTICASONE PRP-SOD.CHL,BICARB 50 MCG- 0.9 % KIT,SPRAY SUSPENSION AND SPRAY    Administer 2 sprays into each nostril once daily.    HYALUR AC-CHOND SUL-COLG II-AA (HYALURONIC ACID, CHOND-COLLGN,) 40- MG CAPSULE    Take 1 capsule by mouth once daily.    LORATADINE (CLARITIN) 10 MG TABLET    Take 1 tablet (10 mg) by mouth once daily at bedtime.    NAPROXEN (NAPROSYN) 500 MG TABLET    Take 1 tablet (500 mg) by mouth every 12 hours if needed for mild pain (1 - 3) or moderate pain (4 - 6).    OMEPRAZOLE (PRILOSEC) 40 MG DR CAPSULE    Take 1 capsule (40 mg) by mouth once daily.    PROGESTERONE (PROMETRIUM) 100 MG CAPSULE    Take 1 capsule (100 mg) by mouth once daily.    PSEUDOEPHEDRINE (SUDAFED) 30 MG TABLET    Take 1 tablet (30 mg) by mouth every 6 hours if needed for congestion for up to 7 days.    RIZATRIPTAN MLT (MAXALT-MLT) 10 MG DISINTEGRATING TABLET    TAKE 1 TABLET AT ONSET OF HEADACHE. MAY REPEAT EVERY 2 HOURS AS NEEDED. MAXIMUM 3 TABLETS IN 24 HOURS.    ROPINIROLE (REQUIP) 4 MG TABLET    Take 1 tablet (4 mg) by mouth once daily at bedtime.     TOPIRAMATE (TOPAMAX) 50 MG TABLET    Take 1 tablet (50 mg) by mouth in the morning and 1 tablet (50 mg) before bedtime.       ALLERGIES     Ciprofloxacin, Hydrocodone-acetaminophen, Other, Vancomycin, and Hydrocodone-guaifenesin    FAMILY HISTORY       Family History   Problem Relation Name Age of Onset    No Known Problems Mother      No Known Problems Father          SOCIAL HISTORY       Social History     Socioeconomic History    Marital status: Single   Tobacco Use    Smoking status: Former     Types: Cigarettes    Smokeless tobacco: Never   Vaping Use    Vaping status: Never Used   Substance and Sexual Activity    Alcohol use: Yes     Comment: rare    Drug use: Never       PHYSICAL EXAM       ED Triage Vitals [12/11/24 0023]   Temperature Heart Rate Respirations BP   36.3 °C (97.3 °F) 86 16 (!) 158/106      Pulse Ox Temp Source Heart Rate Source Patient Position   98 % Temporal Monitor Sitting      BP Location FiO2 (%)     Right arm --       Physical Exam  Vitals and nursing note reviewed.   Constitutional:       General: She is not in acute distress.     Appearance: She is well-developed.   HENT:      Head: Normocephalic and atraumatic.   Eyes:      Conjunctiva/sclera: Conjunctivae normal.   Cardiovascular:      Rate and Rhythm: Normal rate and regular rhythm.      Heart sounds: No murmur heard.  Pulmonary:      Effort: Pulmonary effort is normal. No respiratory distress.      Breath sounds: Normal breath sounds.   Abdominal:      Palpations: Abdomen is soft.      Tenderness: There is abdominal tenderness (suprapubic tenderness). There is no right CVA tenderness, left CVA tenderness, guarding or rebound.   Skin:     General: Skin is warm and dry.   Neurological:      General: No focal deficit present.      Mental Status: She is alert and oriented to person, place, and time.   Psychiatric:         Mood and Affect: Mood normal.        DIAGNOSTIC RESULTS     LABS:  Labs Reviewed   URINALYSIS WITH REFLEX CULTURE  AND MICROSCOPIC    Narrative:     The following orders were created for panel order Urinalysis with Reflex Culture and Microscopic.  Procedure                               Abnormality         Status                     ---------                               -----------         ------                     Urinalysis with Reflex C...[[id number]]                                                 Extra Urine Gray Tube[[id number]]                                                         Please view results for these tests on the individual orders.   HCG, URINE, QUALITATIVE   URINALYSIS WITH REFLEX CULTURE AND MICROSCOPIC   EXTRA URINE GRAY TUBE       All other labs were within normal range or not returned as of this dictation.    Imaging  No orders to display        Procedures  Procedures     EMERGENCY DEPARTMENT COURSE/MDM:   Medical Decision Making  Елена Vaughn is an 44 y.o. female with history including anal adenocarcinoma in remission, menopausal presenting to the emergency department for urinary symptoms.  On examination patient does have some suprapubic tenderness to deep palpation.  Most likely UTI but given the history of cancer and radiation with surgical complications CT imaging ordered to rule out any other potential causes.    Lab workup reviewed shows a positive urinalysis.  No acute kidney injury.  No major electrolyte abnormalities.  Patient given a dose of Keflex here.  CT imaging reviewed shows evidence of cystitis hepatic steatosis and hepatomegaly but no other concerning findings at this time.    Patient discharged with Keflex for her urinary tract infection and given discharge instructions.        ED Course as of 12/11/24 0553   Wed Dec 11, 2024   0309 IMPRESSION:  Mild wall thickening of the urinary bladder. Correlation for cystitis  is suggested.      Hepatic steatosis and hepatomegaly      Duodenal diverticulum and additional findings as detailed.       [TL]   0309 Patient with acute UTI.  Not  septic.  Will prescribe Keflex with first dose given in the emergency department. [TL]      ED Course User Index  [TL] David Amador DO         Diagnoses as of 12/11/24 0553   Acute cystitis without hematuria        External records reviewed: recent inpatient, clinic, and prior ED notes  Labs and Diagnostic imaging independently reviewed/interpreted by me.    Patient plan, care, lab results and imaging were all discussed with attending.    ED Medications administered this visit:  Medications - No data to display  New Prescriptions from this visit:    New Prescriptions    No medications on file       (Please note that portions of this note were completed with a voice recognition program.  Efforts were made to edit the dictations but occasionally words are mis-transcribed.)     Mimi Clay DO  Resident  12/11/24 7725

## 2024-12-13 LAB — BACTERIA UR CULT: ABNORMAL

## 2024-12-14 ENCOUNTER — TELEPHONE (OUTPATIENT)
Dept: PHARMACY | Facility: HOSPITAL | Age: 44
End: 2024-12-14

## 2024-12-14 NOTE — PROGRESS NOTES
EDPD Note: Duration Adjust    Contacted Елена Vaughn regarding positive urine culture/result that was taken during their recent emergency room visit. I completed education with patient. The patient is being treated with the proper medication; however, the duration of the discharge prescription will be adjusted due to symptom improvement. I gave verbal education about the new medication duration found below. No further follow up needed from EDPD Team.     Discharged with:   Drug: Keflex 500 mg  Si capsule PO BID x 10 days (shortened to 7)  Days Supply: 20 (shortened to 14)    Susceptibility data from last 90 days.  Collected Specimen Info Organism Amoxicillin/Clavulanate Ampicillin Ampicillin/Sulbactam Cefazolin Cefazolin (uncomplicated UTIs only) Ceftriaxone Ciprofloxacin Gentamicin Nitrofurantoin Piperacillin/Tazobactam   12/10/24 Urine from Clean Catch/Voided Escherichia coli  I  R  R  I  S  S  S  S  S  S     Collected Specimen Info Organism Trimethoprim/Sulfamethoxazole   12/10/24 Urine from Clean Catch/Voided Escherichia coli  S     Patient presented to the emergency department with urinary symptoms including increased urgency/frequency and suprapubic pain. No systemic signs were reported. She was discharged on Keflex 500 mg 1 cap PO BID x 10 days. Per her urine culture, this is susceptible. Patient states she is feeling much better and therapy duration may be decreased to 7 days. She was counseled to return to the ED if any new/worsening symptoms arise.     If there are any other questions for the ED Post-Discharge Culture Follow Up Team, please contact [phone]. Fax: [phone].    Lisa Banks, SaidaD   Meds PGY-1 Resident   [phone]

## 2025-02-07 ENCOUNTER — APPOINTMENT (OUTPATIENT)
Dept: RADIOLOGY | Facility: HOSPITAL | Age: 45
End: 2025-02-07

## 2025-02-07 ENCOUNTER — APPOINTMENT (OUTPATIENT)
Dept: CARDIOLOGY | Facility: HOSPITAL | Age: 45
End: 2025-02-07

## 2025-02-07 ENCOUNTER — HOSPITAL ENCOUNTER (EMERGENCY)
Facility: HOSPITAL | Age: 45
Discharge: HOME | End: 2025-02-07
Attending: EMERGENCY MEDICINE

## 2025-02-07 VITALS
RESPIRATION RATE: 16 BRPM | TEMPERATURE: 98.4 F | BODY MASS INDEX: 40.75 KG/M2 | SYSTOLIC BLOOD PRESSURE: 120 MMHG | WEIGHT: 230 LBS | HEART RATE: 94 BPM | DIASTOLIC BLOOD PRESSURE: 67 MMHG | OXYGEN SATURATION: 96 % | HEIGHT: 63 IN

## 2025-02-07 DIAGNOSIS — J02.9 SORE THROAT: Primary | ICD-10-CM

## 2025-02-07 LAB
ALBUMIN SERPL BCP-MCNC: 4.6 G/DL (ref 3.4–5)
ALP SERPL-CCNC: 73 U/L (ref 33–110)
ALT SERPL W P-5'-P-CCNC: 18 U/L (ref 7–45)
ANION GAP SERPL CALC-SCNC: 14 MMOL/L (ref 10–20)
AST SERPL W P-5'-P-CCNC: 15 U/L (ref 9–39)
ATRIAL RATE: 112 BPM
B-HCG SERPL-ACNC: <2 MIU/ML
BASOPHILS # BLD AUTO: 0.09 X10*3/UL (ref 0–0.1)
BASOPHILS NFR BLD AUTO: 0.5 %
BILIRUB SERPL-MCNC: 0.5 MG/DL (ref 0–1.2)
BUN SERPL-MCNC: 14 MG/DL (ref 6–23)
CALCIUM SERPL-MCNC: 9.4 MG/DL (ref 8.6–10.3)
CARDIAC TROPONIN I PNL SERPL HS: 3 NG/L (ref 0–13)
CARDIAC TROPONIN I PNL SERPL HS: 4 NG/L (ref 0–13)
CHLORIDE SERPL-SCNC: 103 MMOL/L (ref 98–107)
CO2 SERPL-SCNC: 22 MMOL/L (ref 21–32)
CREAT SERPL-MCNC: 0.77 MG/DL (ref 0.5–1.05)
EGFRCR SERPLBLD CKD-EPI 2021: >90 ML/MIN/1.73M*2
EOSINOPHIL # BLD AUTO: 0.15 X10*3/UL (ref 0–0.7)
EOSINOPHIL NFR BLD AUTO: 0.9 %
ERYTHROCYTE [DISTWIDTH] IN BLOOD BY AUTOMATED COUNT: 13.6 % (ref 11.5–14.5)
FLUAV RNA RESP QL NAA+PROBE: NOT DETECTED
FLUBV RNA RESP QL NAA+PROBE: NOT DETECTED
GLUCOSE SERPL-MCNC: 112 MG/DL (ref 74–99)
HCT VFR BLD AUTO: 42.5 % (ref 36–46)
HGB BLD-MCNC: 14.1 G/DL (ref 12–16)
HOLD SPECIMEN: NORMAL
IMM GRANULOCYTES # BLD AUTO: 0.08 X10*3/UL (ref 0–0.7)
IMM GRANULOCYTES NFR BLD AUTO: 0.5 % (ref 0–0.9)
LYMPHOCYTES # BLD AUTO: 1.77 X10*3/UL (ref 1.2–4.8)
LYMPHOCYTES NFR BLD AUTO: 10.3 %
MAGNESIUM SERPL-MCNC: 1.91 MG/DL (ref 1.6–2.4)
MCH RBC QN AUTO: 30.3 PG (ref 26–34)
MCHC RBC AUTO-ENTMCNC: 33.2 G/DL (ref 32–36)
MCV RBC AUTO: 91 FL (ref 80–100)
MONOCYTES # BLD AUTO: 1.1 X10*3/UL (ref 0.1–1)
MONOCYTES NFR BLD AUTO: 6.4 %
NEUTROPHILS # BLD AUTO: 13.92 X10*3/UL (ref 1.2–7.7)
NEUTROPHILS NFR BLD AUTO: 81.4 %
NRBC BLD-RTO: 0 /100 WBCS (ref 0–0)
P AXIS: 49 DEGREES
P OFFSET: 212 MS
P ONSET: 157 MS
PLATELET # BLD AUTO: 327 X10*3/UL (ref 150–450)
POTASSIUM SERPL-SCNC: 3.8 MMOL/L (ref 3.5–5.3)
PR INTERVAL: 130 MS
PROT SERPL-MCNC: 7.7 G/DL (ref 6.4–8.2)
Q ONSET: 222 MS
QRS COUNT: 18 BEATS
QRS DURATION: 74 MS
QT INTERVAL: 320 MS
QTC CALCULATION(BAZETT): 436 MS
QTC FREDERICIA: 394 MS
R AXIS: 37 DEGREES
RBC # BLD AUTO: 4.66 X10*6/UL (ref 4–5.2)
SARS-COV-2 RNA RESP QL NAA+PROBE: NOT DETECTED
SODIUM SERPL-SCNC: 135 MMOL/L (ref 136–145)
T AXIS: 22 DEGREES
T OFFSET: 382 MS
VENTRICULAR RATE: 112 BPM
WBC # BLD AUTO: 17.1 X10*3/UL (ref 4.4–11.3)

## 2025-02-07 PROCEDURE — 36415 COLL VENOUS BLD VENIPUNCTURE: CPT

## 2025-02-07 PROCEDURE — 84702 CHORIONIC GONADOTROPIN TEST: CPT

## 2025-02-07 PROCEDURE — 84484 ASSAY OF TROPONIN QUANT: CPT

## 2025-02-07 PROCEDURE — 96361 HYDRATE IV INFUSION ADD-ON: CPT

## 2025-02-07 PROCEDURE — 96374 THER/PROPH/DIAG INJ IV PUSH: CPT

## 2025-02-07 PROCEDURE — 80053 COMPREHEN METABOLIC PANEL: CPT

## 2025-02-07 PROCEDURE — 2500000001 HC RX 250 WO HCPCS SELF ADMINISTERED DRUGS (ALT 637 FOR MEDICARE OP)

## 2025-02-07 PROCEDURE — 71045 X-RAY EXAM CHEST 1 VIEW: CPT

## 2025-02-07 PROCEDURE — 71045 X-RAY EXAM CHEST 1 VIEW: CPT | Mod: FOREIGN READ | Performed by: RADIOLOGY

## 2025-02-07 PROCEDURE — 85025 COMPLETE CBC W/AUTO DIFF WBC: CPT

## 2025-02-07 PROCEDURE — 99284 EMERGENCY DEPT VISIT MOD MDM: CPT | Performed by: EMERGENCY MEDICINE

## 2025-02-07 PROCEDURE — 2500000004 HC RX 250 GENERAL PHARMACY W/ HCPCS (ALT 636 FOR OP/ED)

## 2025-02-07 PROCEDURE — 83735 ASSAY OF MAGNESIUM: CPT

## 2025-02-07 PROCEDURE — 99285 EMERGENCY DEPT VISIT HI MDM: CPT | Performed by: EMERGENCY MEDICINE

## 2025-02-07 PROCEDURE — 87636 SARSCOV2 & INF A&B AMP PRB: CPT

## 2025-02-07 PROCEDURE — 93005 ELECTROCARDIOGRAM TRACING: CPT

## 2025-02-07 RX ORDER — AMOXICILLIN AND CLAVULANATE POTASSIUM 875; 125 MG/1; MG/1
1 TABLET, FILM COATED ORAL EVERY 12 HOURS
Qty: 20 TABLET | Refills: 0 | Status: SHIPPED | OUTPATIENT
Start: 2025-02-07 | End: 2025-02-17

## 2025-02-07 RX ORDER — ACETAMINOPHEN 325 MG/1
975 TABLET ORAL ONCE
Status: COMPLETED | OUTPATIENT
Start: 2025-02-07 | End: 2025-02-07

## 2025-02-07 RX ORDER — KETOROLAC TROMETHAMINE 15 MG/ML
15 INJECTION, SOLUTION INTRAMUSCULAR; INTRAVENOUS ONCE
Status: COMPLETED | OUTPATIENT
Start: 2025-02-07 | End: 2025-02-07

## 2025-02-07 RX ADMIN — KETOROLAC TROMETHAMINE 15 MG: 15 INJECTION, SOLUTION INTRAMUSCULAR; INTRAVENOUS at 05:32

## 2025-02-07 RX ADMIN — ACETAMINOPHEN 975 MG: 325 TABLET ORAL at 03:54

## 2025-02-07 RX ADMIN — SODIUM CHLORIDE, POTASSIUM CHLORIDE, SODIUM LACTATE AND CALCIUM CHLORIDE 1000 ML: 600; 310; 30; 20 INJECTION, SOLUTION INTRAVENOUS at 03:54

## 2025-02-07 ASSESSMENT — PAIN DESCRIPTION - PAIN TYPE: TYPE: ACUTE PAIN

## 2025-02-07 ASSESSMENT — PAIN DESCRIPTION - PROGRESSION: CLINICAL_PROGRESSION: NOT CHANGED

## 2025-02-07 ASSESSMENT — PAIN SCALES - GENERAL
PAINLEVEL_OUTOF10: 10 - WORST POSSIBLE PAIN
PAINLEVEL_OUTOF10: 0 - NO PAIN
PAINLEVEL_OUTOF10: 3

## 2025-02-07 ASSESSMENT — PAIN DESCRIPTION - ONSET: ONSET: AWAKENED FROM SLEEP

## 2025-02-07 ASSESSMENT — PAIN DESCRIPTION - DESCRIPTORS: DESCRIPTORS: SHARP

## 2025-02-07 ASSESSMENT — PAIN DESCRIPTION - LOCATION: LOCATION: THROAT

## 2025-02-07 ASSESSMENT — PAIN - FUNCTIONAL ASSESSMENT: PAIN_FUNCTIONAL_ASSESSMENT: 0-10

## 2025-02-07 ASSESSMENT — PAIN DESCRIPTION - FREQUENCY: FREQUENCY: CONSTANT/CONTINUOUS

## 2025-02-07 NOTE — Clinical Note
Елена Roderick was seen and treated in our emergency department on 2/7/2025.  She may return to work on 02/10/2025.       If you have any questions or concerns, please don't hesitate to call.      Kishan Dias, DO

## 2025-02-07 NOTE — Clinical Note
Paramjit Vaughn accompanied Елена Vaughn to the emergency department on 2/7/2025. They may return to work on 02/08/2025.      If you have any questions or concerns, please don't hesitate to call.      Kishan Dias, DO

## 2025-02-07 NOTE — ED PROVIDER NOTES
HPI   Chief Complaint   Patient presents with    Sore Throat    Shortness of Breath       This is a 44 years old female patient presented to the emergency department with a chief complaint of cough, shortness of breath, sore throat, body aches, chills, and fever.  Her symptoms started last night.  Reported fever at home.  Denies chest pain, abdominal pain, nausea, vomiting, diarrhea, constipation, weakness, numbness or urinary symptoms.  Also reported bilateral ear pain.    Review of system: As stated above in the HPI section.              Patient History   Past Medical History:   Diagnosis Date    Abnormal weight gain     Weight gain    Ataxic gait     Ataxic gait    Cough 03/06/2023    Fever 03/06/2023    Left wrist pain 03/06/2023    Malignant (primary) neoplasm, unspecified (Multi)     Cancer    Other intervertebral disc degeneration, lumbar region     Disc degeneration, lumbar    Other specific joint derangements of unspecified joint, not elsewhere classified     Joint crepitus    Paresthesia of skin     Tingling    Personal history of other diseases of the female genital tract     History of polycystic ovarian syndrome    Personal history of other diseases of the musculoskeletal system and connective tissue     History of low back pain    Personal history of other diseases of the nervous system and sense organs     History of migraine headaches    Personal history of other endocrine, nutritional and metabolic disease 08/01/2019    History of iron deficiency    Postmenopausal atrophic vaginitis 08/01/2019    Atrophic vaginitis    Sinobronchitis 03/06/2023    Sinus pain 03/06/2023     Past Surgical History:   Procedure Laterality Date    HERNIA REPAIR  07/19/2013    Hernia Repair    OTHER SURGICAL HISTORY  08/01/2019    Surgery     Family History   Problem Relation Name Age of Onset    No Known Problems Mother      No Known Problems Father       Social History     Tobacco Use    Smoking status: Former     Types:  Cigarettes    Smokeless tobacco: Never   Vaping Use    Vaping status: Never Used   Substance Use Topics    Alcohol use: Yes     Comment: rare    Drug use: Never       Physical Exam   ED Triage Vitals [02/07/25 0335]   Temperature Heart Rate Respirations BP   38 °C (100.4 °F) (!) 129 18 (!) 188/94      Pulse Ox Temp Source Heart Rate Source Patient Position   98 % Temporal Monitor Sitting      BP Location FiO2 (%)     Right arm --       Physical Exam  Vitals and nursing note reviewed.   Constitutional:       General: She is not in acute distress.     Appearance: She is well-developed. She is not ill-appearing, toxic-appearing or diaphoretic.   HENT:      Head: Normocephalic and atraumatic.      Right Ear: Tympanic membrane and ear canal normal. Tympanic membrane is not erythematous.      Left Ear: Tympanic membrane and ear canal normal. Tympanic membrane is not erythematous.      Mouth/Throat:      Pharynx: Posterior oropharyngeal erythema present. No oropharyngeal exudate.   Eyes:      Conjunctiva/sclera: Conjunctivae normal.   Cardiovascular:      Rate and Rhythm: Normal rate and regular rhythm.      Heart sounds: No murmur heard.  Pulmonary:      Effort: Pulmonary effort is normal. No respiratory distress.      Breath sounds: Normal breath sounds.   Abdominal:      Palpations: Abdomen is soft.      Tenderness: There is no abdominal tenderness.   Musculoskeletal:         General: No swelling.      Cervical back: Neck supple.   Skin:     General: Skin is warm and dry.      Capillary Refill: Capillary refill takes less than 2 seconds.   Neurological:      Mental Status: She is alert.   Psychiatric:         Mood and Affect: Mood normal.           ED Course & MDM   Diagnoses as of 02/07/25 0545   Sore throat                 No data recorded     [place] Coma Scale Score: 15 (02/07/25 0453 : Veronica Castorena RN)                           Medical Decision Making  Patient is seen and examined, tested negative for flu and  COVID-19.  Chest x-ray is unremarkable.  Presentation is more concerning for viral illness.  Patient received IV fluids, Tylenol, Toradol.  Tachycardia improved.  Discharged home with prescription of Augmentin to use in case that her symptoms specially the sore throat started to get worse over the next 48 hours.  Discharged in stable condition to follow-up with the primary care provider and with instruction to return to the ED if alarming symptoms arise as per discharge instruction and to use Tylenol/Motrin for symptoms relief.      I saw and evaluated the patient. I personally obtained the key and critical portions of the history and physical exam or was physically present for key and critical portions performed by the resident/fellow. I reviewed the resident/fellow's documentation and discussed the patient with the resident/fellow. I agree with the resident/fellow's medical decision making as documented in the note.    Feliciano Murry, DO         Procedure  Procedures     Feliciano Murry,   02/07/25 0551

## 2025-02-07 NOTE — DISCHARGE INSTRUCTIONS
Please follow-up with your primary care provider in the next few days for continued management.  Overall your symptoms are most likely due to a viral illness.  Please manage symptoms with Tylenol/ibuprofen as needed for fever, ensuring good fluid intake, rest, and humidifier use.  Return to the emergency department or seek immediate medical care if there are any new or worsening signs of shortness of breath, difficulty breathing, wheezing, concern for dehydration or any new or worsening symptoms.  Given the concern of your sore throat we went prescribe Augmentin if your symptoms continues to get worse over the next 48 hours start to take the Augmentin.

## 2025-02-15 LAB
ATRIAL RATE: 114 BPM
P AXIS: 65 DEGREES
P OFFSET: 210 MS
P ONSET: 155 MS
PR INTERVAL: 130 MS
Q ONSET: 220 MS
QRS COUNT: 19 BEATS
QRS DURATION: 74 MS
QT INTERVAL: 308 MS
QTC CALCULATION(BAZETT): 424 MS
QTC FREDERICIA: 381 MS
R AXIS: 58 DEGREES
T AXIS: 37 DEGREES
T OFFSET: 374 MS
VENTRICULAR RATE: 114 BPM

## 2025-06-14 ENCOUNTER — HOSPITAL ENCOUNTER (EMERGENCY)
Facility: HOSPITAL | Age: 45
Discharge: HOME | End: 2025-06-14
Attending: STUDENT IN AN ORGANIZED HEALTH CARE EDUCATION/TRAINING PROGRAM

## 2025-06-14 VITALS
TEMPERATURE: 98.6 F | RESPIRATION RATE: 18 BRPM | BODY MASS INDEX: 38.32 KG/M2 | HEIGHT: 65 IN | DIASTOLIC BLOOD PRESSURE: 87 MMHG | SYSTOLIC BLOOD PRESSURE: 144 MMHG | OXYGEN SATURATION: 99 % | WEIGHT: 230 LBS | HEART RATE: 91 BPM

## 2025-06-14 DIAGNOSIS — J02.9 ACUTE PHARYNGITIS, UNSPECIFIED ETIOLOGY: Primary | ICD-10-CM

## 2025-06-14 LAB
FLUAV RNA RESP QL NAA+PROBE: NOT DETECTED
FLUBV RNA RESP QL NAA+PROBE: NOT DETECTED
S PYO DNA THROAT QL NAA+PROBE: NOT DETECTED
SARS-COV-2 RNA RESP QL NAA+PROBE: NOT DETECTED

## 2025-06-14 PROCEDURE — 99283 EMERGENCY DEPT VISIT LOW MDM: CPT | Performed by: STUDENT IN AN ORGANIZED HEALTH CARE EDUCATION/TRAINING PROGRAM

## 2025-06-14 PROCEDURE — 99284 EMERGENCY DEPT VISIT MOD MDM: CPT | Performed by: STUDENT IN AN ORGANIZED HEALTH CARE EDUCATION/TRAINING PROGRAM

## 2025-06-14 PROCEDURE — 2500000004 HC RX 250 GENERAL PHARMACY W/ HCPCS (ALT 636 FOR OP/ED): Performed by: STUDENT IN AN ORGANIZED HEALTH CARE EDUCATION/TRAINING PROGRAM

## 2025-06-14 PROCEDURE — 87651 STREP A DNA AMP PROBE: CPT

## 2025-06-14 PROCEDURE — 87081 CULTURE SCREEN ONLY: CPT | Mod: STJLAB

## 2025-06-14 PROCEDURE — 87636 SARSCOV2 & INF A&B AMP PRB: CPT

## 2025-06-14 RX ADMIN — DEXAMETHASONE 10 MG: 6 TABLET ORAL at 15:20

## 2025-06-14 ASSESSMENT — LIFESTYLE VARIABLES
EVER HAD A DRINK FIRST THING IN THE MORNING TO STEADY YOUR NERVES TO GET RID OF A HANGOVER: NO
HAVE PEOPLE ANNOYED YOU BY CRITICIZING YOUR DRINKING: NO
TOTAL SCORE: 0
HAVE YOU EVER FELT YOU SHOULD CUT DOWN ON YOUR DRINKING: NO
EVER FELT BAD OR GUILTY ABOUT YOUR DRINKING: NO

## 2025-06-14 ASSESSMENT — PAIN - FUNCTIONAL ASSESSMENT: PAIN_FUNCTIONAL_ASSESSMENT: 0-10

## 2025-06-14 ASSESSMENT — PAIN SCALES - GENERAL: PAINLEVEL_OUTOF10: 5 - MODERATE PAIN

## 2025-06-14 ASSESSMENT — PAIN DESCRIPTION - LOCATION: LOCATION: MOUTH

## 2025-06-14 NOTE — ED PROVIDER NOTES
Emergency Department Provider Note        History of Present Illness     History provided by: Patient  Limitations to History: None  External Records Reviewed with Brief Summary: None    HPI:  Елена Vaughn is a 44 y.o. female presenting to the [facility] emergency department with chief complaint of sore throat, fatigue, malaise, fever, and ear pain.  Patient says that she first started experiencing these symptoms on Wednesday, she measured her fever at home to go up to approximate 103 °F.  The patient has been having some nasal congestion as well but nothing productive, she believes that her congestion has been causing her to cough otherwise she has not had a dry cough necessarily.  The patient says that she sees white spots on her tongue as well.  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  She has been taking the Vicks NyQuil and DayQuil as well as lozenges throughout the day to help with the symptoms and she has been able to eat more soft food as well as drink fluids, if she were to eat more hard foods they will be painful to swallow for her.    Past Medical History:  No date: Abnormal weight gain      Comment:  Weight gain  No date: Ataxic gait      Comment:  Ataxic gait  03/06/2023: Cough  03/06/2023: Fever  03/06/2023: Left wrist pain  No date: Malignant (primary) neoplasm, unspecified (Multi)      Comment:  Cancer  No date: Other intervertebral disc degeneration, lumbar region      Comment:  Disc degeneration, lumbar  No date: Other specific joint derangements of unspecified joint, not   elsewhere classified      Comment:  Joint crepitus  No date: Paresthesia of skin      Comment:  Tingling  No date: Personal history of other diseases of the female genital   tract      Comment:  History of polycystic ovarian syndrome  No date: Personal history of other diseases of the musculoskeletal   system and connective tissue      Comment:  History of low  back pain  No date: Personal history of other diseases of the nervous system and   sense organs      Comment:  History of migraine headaches  2019: Personal history of other endocrine, nutritional and   metabolic disease      Comment:  History of iron deficiency  2019: Postmenopausal atrophic vaginitis      Comment:  Atrophic vaginitis  2023: Sinobronchitis  2023: Sinus pain     Physical Exam   Triage vitals:  T 36.8 °C (98.3 °F)  HR 89  BP (!) 139/92  RR 18  O2 98 % None (Room air)    General: Awake, alert, in no acute distress  Eyes: Gaze conjugate.  No scleral icterus or injection  HENT: Normo-cephalic, atraumatic. No stridor.  Inflamed and multiple white lesions on the tonsils.  CV: Regular rate, regular rhythm. Radial pulses 2+ bilaterally  Resp: Breathing non-labored, speaking in full sentences.  Clear to auscultation bilaterally  GI: Soft, non-distended, non-tender. No rebound or guarding.  MSK/Extremities: No gross bony deformities. Moving all extremities  Skin: Warm. Appropriate color  Neuro: Alert. Oriented. Face symmetric. Speech is fluent.  Gross strength and sensation intact in b/l UE and LEs  Psych: Appropriate mood and affect    Medical Decision Making & ED Course   Medical Decision Makin y.o. female presenting with a sore throat and other symptoms similar to flu and having malaise.    The patient remains afebrile normotensive while in the emergency department.    Strep PCR and SARS influenza PCR both returned showing no detection of bacteria or virus.    The patient is given Decadron to help with the inflammation that she is experiencing.    With strep not being found for this patient there is a likelihood that it is a false negative and the strep culture will show growth of strep and there is also a possibility that this patient is undergoing an infection of mononucleosis.    The patient has been providing supportive care for herself with being able to eat soft  solid food, drink fluids, and rest and she will continue to do so if this ends up being a virus.  If the strep PCR does result showing her to have positive culture she will visit the emergency department, in urgent care, or her primary provider for help with antibiotics, she has access to MyChart and says that she will check it to see when the results come back.    The patient is comfortable with the plan of supportive care until definitive proof that she does have strep and she will return to the emergency department if her symptoms do get worse even if her strep is negative.    The patient is comfortable with being discharged.    Shared decision making for disposition  Patient and/or patient´s representative was counseled regarding labs, imaging, likely diagnosis. All questions were answered. Recommendation was made for discharge home. The patient agreed and was discharged home in stable condition with appropriate relevant educational materials. Return precautions were provided which included fever of 100.4 (38C) or higher that does not respond to acetaminophen or ibuprofen, persistent vomiting, inability to open your jaw, hot potato voice, stridor, difficulty with physically swallowing, difficulty breathing, chest pain, or worsening of your current symptoms.     ----      Differential diagnoses considered include but are not limited to: Strep throat, mononucleosis, COVID     Social Determinants of Health which Significantly Impact Care: None identified     EKG Independent Interpretation: EKG not obtained    Independent Result Review and Interpretation: Relevant laboratory and radiographic results were reviewed and independently interpreted by myself.  As necessary, they are commented on in the ED Course.    Chronic conditions affecting the patient's care: As documented above in [facility]    The patient was discussed with the following consultants/services: None    Care Considerations: As documented above in [facility]    ED  Course:  Diagnoses as of 06/15/25 0033   Acute pharyngitis, unspecified etiology     Disposition   As a result of the work-up, the patient was discharged home.  she was informed of her diagnosis and instructed to come back with any concerns or worsening of condition.  she and was agreeable to the plan as discussed above.  she was given the opportunity to ask questions.  All of the patient's questions were answered.    Procedures   Procedures    Patient seen and discussed with ED attending physician.    Ibrahima Almaguer, DO  Emergency Medicine     Ibrahima Almaguer, DO  Resident  06/15/25 0033

## 2025-06-14 NOTE — DISCHARGE INSTRUCTIONS
Please return to the ER or seek immediate medical attention if you experience fever of 100.4 (38C) or higher that does not respond to acetaminophen or ibuprofen, persistent vomiting, inability to open your jaw, hot potato voice, stridor, difficulty with physically swallowing, difficulty breathing, chest pain, or worsening of your current symptoms    You are welcome back any time. Thank you for entrusting your care to us, I hope we made your visit as pleasant as possible. Wishing you well!    Dr. Almaguer

## 2025-06-16 LAB — S PYO THROAT QL CULT: NORMAL

## 2025-08-12 ENCOUNTER — OFFICE VISIT (OUTPATIENT)
Dept: URGENT CARE | Age: 45
End: 2025-08-12
Payer: COMMERCIAL

## 2025-08-12 VITALS
BODY MASS INDEX: 39.27 KG/M2 | HEART RATE: 88 BPM | RESPIRATION RATE: 19 BRPM | SYSTOLIC BLOOD PRESSURE: 118 MMHG | HEIGHT: 64 IN | OXYGEN SATURATION: 98 % | DIASTOLIC BLOOD PRESSURE: 84 MMHG | WEIGHT: 230 LBS | TEMPERATURE: 98.8 F

## 2025-08-12 DIAGNOSIS — S39.012A ACUTE MYOFASCIAL STRAIN OF LUMBAR REGION, INITIAL ENCOUNTER: Primary | ICD-10-CM

## 2025-08-12 RX ORDER — TRAMADOL HYDROCHLORIDE 50 MG/1
50 TABLET, FILM COATED ORAL EVERY 6 HOURS PRN
Qty: 12 TABLET | Refills: 0 | Status: SHIPPED | OUTPATIENT
Start: 2025-08-12 | End: 2025-08-15

## 2025-08-12 RX ORDER — CYCLOBENZAPRINE HCL 10 MG
10 TABLET ORAL 3 TIMES DAILY PRN
Qty: 30 TABLET | Refills: 0 | Status: SHIPPED | OUTPATIENT
Start: 2025-08-12

## 2025-08-12 RX ORDER — PREDNISONE 10 MG/1
TABLET ORAL
Qty: 30 TABLET | Refills: 0 | Status: SHIPPED | OUTPATIENT
Start: 2025-08-12

## 2025-08-12 ASSESSMENT — PAIN SCALES - GENERAL: PAINLEVEL_OUTOF10: 10-WORST PAIN EVER
